# Patient Record
Sex: FEMALE | Race: WHITE | NOT HISPANIC OR LATINO | Employment: OTHER | URBAN - METROPOLITAN AREA
[De-identification: names, ages, dates, MRNs, and addresses within clinical notes are randomized per-mention and may not be internally consistent; named-entity substitution may affect disease eponyms.]

---

## 2012-06-08 LAB — CRC RECOMMENDATION EXT: NORMAL

## 2017-01-25 PROBLEM — R26.89 LOSS OF BALANCE: Status: ACTIVE | Noted: 2017-01-25

## 2017-01-25 PROBLEM — R42 DIZZINESS: Status: ACTIVE | Noted: 2017-01-25

## 2017-01-25 PROBLEM — Z85.3 HISTORY OF BREAST CANCER: Status: ACTIVE | Noted: 2017-01-25

## 2017-01-25 PROBLEM — Z85.72 HISTORY OF NON-HODGKIN'S LYMPHOMA: Status: ACTIVE | Noted: 2017-01-25

## 2017-02-14 PROBLEM — R73.9 HYPERGLYCEMIA: Status: ACTIVE | Noted: 2017-02-14

## 2017-02-14 PROBLEM — E78.5 HYPERLIPIDEMIA: Status: ACTIVE | Noted: 2017-02-14

## 2017-02-24 PROBLEM — R26.89 LIMPING: Status: ACTIVE | Noted: 2017-02-24

## 2017-02-24 PROBLEM — R42 DIZZINESS: Status: RESOLVED | Noted: 2017-01-25 | Resolved: 2017-02-24

## 2017-02-24 PROBLEM — R26.89 LOSS OF BALANCE: Status: RESOLVED | Noted: 2017-01-25 | Resolved: 2017-02-24

## 2017-06-06 ENCOUNTER — TELEPHONE (OUTPATIENT)
Dept: HEMATOLOGY/ONCOLOGY | Facility: CLINIC | Age: 62
End: 2017-06-06

## 2017-06-06 RX ORDER — HEPARIN 100 UNIT/ML
500 SYRINGE INTRAVENOUS
Status: CANCELLED | OUTPATIENT
Start: 2017-06-07

## 2017-06-06 RX ORDER — SODIUM CHLORIDE 0.9 % (FLUSH) 0.9 %
10 SYRINGE (ML) INJECTION
Status: CANCELLED | OUTPATIENT
Start: 2017-06-07

## 2017-06-07 RX ORDER — HEPARIN 100 UNIT/ML
500 SYRINGE INTRAVENOUS
Status: CANCELLED | OUTPATIENT
Start: 2017-06-07

## 2017-06-07 RX ORDER — SODIUM CHLORIDE 0.9 % (FLUSH) 0.9 %
10 SYRINGE (ML) INJECTION
Status: CANCELLED | OUTPATIENT
Start: 2017-06-07

## 2017-08-08 RX ORDER — SODIUM CHLORIDE 0.9 % (FLUSH) 0.9 %
10 SYRINGE (ML) INJECTION
Status: CANCELLED | OUTPATIENT
Start: 2017-08-08

## 2017-08-08 RX ORDER — HEPARIN 100 UNIT/ML
500 SYRINGE INTRAVENOUS
Status: CANCELLED | OUTPATIENT
Start: 2017-08-08

## 2017-09-12 ENCOUNTER — OFFICE VISIT (OUTPATIENT)
Dept: HEMATOLOGY/ONCOLOGY | Facility: CLINIC | Age: 62
End: 2017-09-12
Payer: MEDICARE

## 2017-09-12 VITALS
RESPIRATION RATE: 18 BRPM | BODY MASS INDEX: 25.57 KG/M2 | WEIGHT: 139.81 LBS | DIASTOLIC BLOOD PRESSURE: 85 MMHG | HEART RATE: 83 BPM | TEMPERATURE: 98 F | SYSTOLIC BLOOD PRESSURE: 132 MMHG

## 2017-09-12 DIAGNOSIS — C85.90: Primary | ICD-10-CM

## 2017-09-12 DIAGNOSIS — R51.9 NEW ONSET OF HEADACHES: ICD-10-CM

## 2017-09-12 DIAGNOSIS — Z85.3 PERSONAL HISTORY OF MALIGNANT NEOPLASM OF BREAST: ICD-10-CM

## 2017-09-12 PROCEDURE — 99214 OFFICE O/P EST MOD 30 MIN: CPT | Mod: ,,, | Performed by: INTERNAL MEDICINE

## 2017-09-12 PROCEDURE — 3075F SYST BP GE 130 - 139MM HG: CPT | Mod: ,,, | Performed by: INTERNAL MEDICINE

## 2017-09-12 PROCEDURE — 3079F DIAST BP 80-89 MM HG: CPT | Mod: ,,, | Performed by: INTERNAL MEDICINE

## 2017-09-13 NOTE — PROGRESS NOTES
[facility] [facility]    Subjective:      Patient ID:   Fanta Berg  61 y.o. female  1955        Chief Complaint:  Cancer follow up, ZENDEJAS x's 2 weeks.    HPI:  61 y.o. female with diagnosis of Bilateral breast cancer 2005.  Seen at [facility].  R MRM, L mastectomy.  No Rad Rx given.    Did take chemo x's 6 months.  Adjuvant tamoxifen x's 5 years.    MGUS . Followed at [facility].    Hx small cell NHL, treated with -R x's 6, then Rituxan maintenance.  Had neuro? Sx after Rituxan 2015, Rituxan D/C ed. Low grade NHL.  Follicular vs marginal NHL.    Chronic LBP sx 2nd DDD. S/P epidural injections.    C/O HA x's 2 weeks.    Hx HTN, rheumatoid fever as a child, GERD, arthritis sx, DDD.    Ovarian tumor removed at [facility] 2005.  R MRM 2005  L mastectomy   T & A  Back surgery   C section x's 3  Breast implants L & R 2006  Port removed 2012  Complete hysterectomy [facility]  M0    Allergy benadryl, codiene, levaquin    Smoke rarely, ETOH no, Job , disabled    Mom small cell lung cancer  Dad pancreatic cancer  Sister HTN  Son AMI  Daughter schizophrenia  Daughter panic attacks  Mom, sisters, daughters no breast cancer    ROS:   GEN: normal without any fever, night sweats or weight loss  HEENT:See HPI.   sore throat, stiff neck, changes in vision  CV: RF as a child,     no CP, SOB, PND, MACHADO or orthopnea  PULM: normal with no SOB, cough, hemoptysis, sputum or pleuritic pain  GI: GERD+.   no abdominal pain, nausea, vomiting, constipation, diarrhea, melanotic stools, BRBPR, or hematemesis  : normal with no hematuria, dysuria  BREAST: See HPI  SKIN: normal with no rash, erythema, bruising, or swelling     Past Medical History:   Diagnosis Date    Breast cancer     Breast cancer     bilat mast, chemo, arimidex     [facility]    Cancer     Cardiomyopathy due to chemotherapy     DDD (degenerative disc disease)     GERD (gastroesophageal reflux disease)     NHL (nodular  "histiocytic lymphoma)     Non Hodgkin's lymphoma     Non-Hodgkin lymphoma     Small cell B-cell lymphoma      Past Surgical History:   Procedure Laterality Date    BACK SURGERY      BREAST SURGERY       SECTION      HYSTERECTOMY      SPINE SURGERY      TONSILLECTOMY      TOTAL ABDOMINAL HYSTERECTOMY W/ BILATERAL SALPINGOOPHORECTOMY         Review of patient's allergies indicates:   Allergen Reactions    Benadryl [diphenhydramine hcl] Anaphylaxis    Levaquin [levofloxacin] Other (See Comments)     Permanent tendon & ligament damage    Ibuprofen-diphenhydramine cit Nausea Only      "Any meds with PM behind them "    Sulfamethoxazole-trimethoprim Nausea And Vomiting    Tolmetin Nausea Only    Codeine Palpitations and Rash    Duloxetine hcl Other (See Comments)     "increased sweating"     Social History     Social History    Marital status: Single     Spouse name: N/A    Number of children: N/A    Years of education: N/A     Occupational History    Not on file.     Social History Main Topics    Smoking status: Former Smoker     Quit date: 2012    Smokeless tobacco: Never Used    Alcohol use No    Drug use: No    Sexual activity: Not on file     Other Topics Concern    Not on file     Social History Narrative    No narrative on file         Current Outpatient Prescriptions:     hydrocodone-acetaminophen 10-325mg (NORCO)  mg Tab, Take 1 tablet by mouth 3 (three) times daily., Disp: , Rfl:     lorazepam (ATIVAN) 1 MG tablet, Take 1 tablet (1 mg total) by mouth daily as needed for Anxiety., Disp: 30 tablet, Rfl: 0          Objective:   Vitals:  Blood pressure 132/85, pulse 83, temperature 98.1 °F (36.7 °C), resp. rate 18, weight 63.4 kg (139 lb 12.8 oz).    Physical Examination:   GEN: no apparent distress, comfortable  HEAD: atraumatic and normocephalic  EYES: no pallor, no icterus  ENT: no pharyngeal erythema, external ears WNL; no nasal discharge; no thrush  NECK: no " "masses, thyroid normal, trachea midline, no LAD/LN's, supple  CV: RRR with no murmur; normal pulse; normal S1 and S2; no pedal edema  CHEST: Normal respiratory effort; CTAB; normal breath sounds; no wheeze or crackles  ABDOM: nontender and nondistended; soft; normal bowel sounds; no rebound/guarding  MUSC/Skeletal: ROM normal; no crepitus; joints normal  EXTREM: no clubbing, cyanosis, inflammation or swelling  SKIN: no rashes, lesions, ulcers, petechiae  : no yanez  NEURO: grossly intact; motor/sensory WNL;  no tremors  PSYCH: normal mood, affect and behavior  LYMPH: normal cervical, supraclavicular, axillary and groin LN's  "BREASTS": no palpable mass      Labs:   Lab Results   Component Value Date    WBC 7.1 07/13/2017    HGB 15.1 07/13/2017    HCT 44.1 07/13/2017    MCV 93.3 07/13/2017     07/13/2017    CMP  Sodium   Date Value Ref Range Status   07/13/2017 139 135 - 146 mmol/L Final     Potassium   Date Value Ref Range Status   07/13/2017 3.8 3.5 - 5.3 mmol/L Final     Chloride   Date Value Ref Range Status   07/13/2017 100 98 - 110 mmol/L Final     CO2   Date Value Ref Range Status   07/13/2017 31 20 - 31 mmol/L Final     Glucose   Date Value Ref Range Status   07/13/2017 80 65 - 99 mg/dL Final     Comment:                   Fasting reference interval          BUN, Bld   Date Value Ref Range Status   07/13/2017 9 7 - 25 mg/dL Final     Creatinine   Date Value Ref Range Status   07/13/2017 0.72 0.50 - 0.99 mg/dL Final     Comment:     For patients >49 years of age, the reference limit  for Creatinine is approximately 13% higher for people  identified as -American.          Calcium   Date Value Ref Range Status   07/13/2017 9.6 8.6 - 10.4 mg/dL Final     Total Protein   Date Value Ref Range Status   07/13/2017 6.8 6.1 - 8.1 g/dL Final     Albumin   Date Value Ref Range Status   07/13/2017 4.5 3.6 - 5.1 g/dL Final     Total Bilirubin   Date Value Ref Range Status   07/13/2017 0.4 0.2 - 1.2 mg/dL " Final     Alkaline Phosphatase   Date Value Ref Range Status   07/13/2017 68 33 - 130 U/L Final     AST   Date Value Ref Range Status   07/13/2017 14 10 - 35 U/L Final     ALT   Date Value Ref Range Status   07/13/2017 13 6 - 29 U/L Final     eGFR if    Date Value Ref Range Status   07/13/2017 103 > OR = 60 mL/min/1.73m2 Final     eGFR if non    Date Value Ref Range Status   07/13/2017 89 > OR = 60 mL/min/1.73m2 Final     I have reviewed all available lab results and radiology reports.    Radiology/Diagnostic Studies:    PET SCAN, MRI of BRAIN ordered, she wants to wait till after 10/4/17.  Has grand children in town.  All lab results and imaging results have been reviewed and discussed with the patient    Assessment:   (1) 61 y.o. female with diagnosis of  Bilateral breast cancer 2005, low grade NHL 2013.       Two week hx HA sx.    (2)Check MRI of brain PET scan.  RTC 10/2017.    Plan:       Lymphosarcoma, mixed cell type, diffuse  -     CBC auto differential; Future; Expected date: 10/09/2017  -     Comprehensive metabolic panel; Future; Expected date: 10/09/2017  -     NM PET CT Routine Skull to Mid Thigh    Personal history of malignant neoplasm of breast  -     CBC auto differential; Future; Expected date: 10/09/2017  -     Comprehensive metabolic panel; Future; Expected date: 10/09/2017  -     NM PET CT Routine Skull to Mid Thigh    New onset of headaches  -     MRI Brain W WO Contrast      Return in about 5 weeks (around 10/16/2017) for follow up on cancer status.

## 2017-10-09 ENCOUNTER — HISTORICAL (OUTPATIENT)
Dept: ADMINISTRATIVE | Facility: HOSPITAL | Age: 62
End: 2017-10-09

## 2017-10-09 LAB
ALBUMIN SERPL-MCNC: 4.3 G/DL (ref 3.1–4.7)
ALP SERPL-CCNC: 63 IU/L (ref 40–104)
ALT (SGPT): 14 IU/L (ref 3–33)
AST SERPL-CCNC: 17 IU/L (ref 10–40)
BASOPHILS NFR BLD: 0 K/UL (ref 0–0.2)
BASOPHILS NFR BLD: 0.5 %
BILIRUB SERPL-MCNC: 0.7 MG/DL (ref 0.3–1)
BUN SERPL-MCNC: 14 MG/DL (ref 8–20)
CALCIUM SERPL-MCNC: 9.6 MG/DL (ref 7.7–10.4)
CHLORIDE: 100 MMOL/L (ref 98–110)
CO2 SERPL-SCNC: 29.5 MMOL/L (ref 22.8–31.6)
CREATININE: 0.63 MG/DL (ref 0.6–1.4)
EOSINOPHIL NFR BLD: 0.1 K/UL (ref 0–0.7)
EOSINOPHIL NFR BLD: 0.8 %
ERYTHROCYTE [DISTWIDTH] IN BLOOD BY AUTOMATED COUNT: 11.6 % (ref 12.5–14.5)
GLUCOSE SERPL-MCNC: 77 MG/DL (ref 70–99)
GLUCOSE: 87 MG/DL (ref 70–99)
GRAN #: 5.2 K/UL (ref 1.4–6.5)
GRAN%: 60.7 %
HCT VFR BLD AUTO: 42.8 % (ref 36–48)
HGB BLD-MCNC: 14.7 G/DL (ref 12–15)
IMMATURE GRANS (ABS): 0 K/UL (ref 0–1)
IMMATURE GRANULOCYTES: 0.4 %
ISTAT CREATININE: 0.7 MG/DL (ref 0.6–1.4)
LYMPH #: 2.5 K/UL (ref 1.2–3.4)
LYMPH%: 28.7 %
MCH RBC QN AUTO: 31.6 PG (ref 25–35)
MCHC RBC AUTO-ENTMCNC: 34.3 G/DL (ref 31–36)
MCV RBC AUTO: 92 FL (ref 79–98)
MONO #: 0.8 K/UL (ref 0.1–0.6)
MONO%: 8.9 %
NUCLEATED RBCS: 0 %
NUCLEATED RED BLOOD CELLS: 0 /100 WBC
PERFORMED BY:: ABNORMAL
PLATELET # BLD AUTO: 282 K/UL (ref 140–440)
PMV BLD AUTO: 8.8 FL (ref 8.8–12.7)
POTASSIUM SERPL-SCNC: 3.8 MMOL/L (ref 3.5–5)
PROT SERPL-MCNC: 7.2 G/DL (ref 6–8.2)
RBC # BLD AUTO: 4.65 M/UL (ref 3.5–5.5)
SODIUM: 137 MMOL/L (ref 134–144)
WBC # BLD: 8.6 K/UL (ref 5–10)

## 2017-10-17 ENCOUNTER — OFFICE VISIT (OUTPATIENT)
Dept: HEMATOLOGY/ONCOLOGY | Facility: CLINIC | Age: 62
End: 2017-10-17
Payer: MEDICARE

## 2017-10-17 VITALS
HEART RATE: 84 BPM | WEIGHT: 141.19 LBS | HEIGHT: 62 IN | DIASTOLIC BLOOD PRESSURE: 76 MMHG | RESPIRATION RATE: 18 BRPM | BODY MASS INDEX: 25.98 KG/M2 | TEMPERATURE: 98 F | SYSTOLIC BLOOD PRESSURE: 121 MMHG

## 2017-10-17 DIAGNOSIS — C83.00 SMALL B-CELL LYMPHOMA, UNSPECIFIED BODY REGION: ICD-10-CM

## 2017-10-17 DIAGNOSIS — C50.111 MALIGNANT NEOPLASM OF CENTRAL PORTION OF RIGHT BREAST IN FEMALE, ESTROGEN RECEPTOR POSITIVE: Primary | ICD-10-CM

## 2017-10-17 DIAGNOSIS — Z17.0 MALIGNANT NEOPLASM OF CENTRAL PORTION OF RIGHT BREAST IN FEMALE, ESTROGEN RECEPTOR POSITIVE: Primary | ICD-10-CM

## 2017-10-17 PROCEDURE — 99214 OFFICE O/P EST MOD 30 MIN: CPT | Mod: ,,, | Performed by: INTERNAL MEDICINE

## 2017-10-17 NOTE — LETTER
October 17, 2017      Bharath Pierre III, MD  [address]  [place] [address]           [facility]  [address]  [address]  [place] [address]  Phone: [phone]  Fax: [phone]          Patient: Fanta Berg   MR Number: [id number]   YOB: 1955   Date of Visit: 10/17/2017       Dear Dr. Bharath Pierre III:    Thank you for referring Fanta Berg to me for evaluation. Attached you will find relevant portions of my assessment and plan of care.    If you have questions, please do not hesitate to call me. I look forward to following Fanta Berg along with you.    Sincerely,    PRIYANK Noonan MD    Enclosure  CC:  No Recipients    If you would like to receive this communication electronically, please contact [email or web address] or [phone] to request more information on [facility] Link access.    For providers and/or their staff who would like to refer a patient to [facility], please contact us through our one-stop-shop provider referral line, [facility], at [phone].    If you feel you have received this communication in error or would no longer like to receive these types of communications, please e-mail [email or web address]

## 2017-10-17 NOTE — PROGRESS NOTES
"     [facility]    Subjective:      Patient ID:   Fanta Berg  62 y.o. female  1955  [unfilled]      Chief Complaint:   Follow-up and Results (labs and scans in epic)      HPI:  62 y.o. female with diagnosis of             ROS:   GEN: normal without any fever, night sweats or weight loss  HEENT: normal with no HA's, sore throat, stiff neck, changes in vision  CV: normal with no CP, SOB, PND, MACHADO or orthopnea  PULM: normal with no SOB, cough, hemoptysis, sputum or pleuritic pain  GI: normal with no abdominal pain, nausea, vomiting, constipation, diarrhea, melanotic stools, BRBPR, or hematemesis  : normal with no hematuria, dysuria  BREAST: normal with no mass, discharge, pain  SKIN: normal with no rash, erythema, bruising, or swelling     Past Medical History:   Diagnosis Date    Breast cancer     Breast cancer     bilat mast, chemo, arimidex     [facility]    Cancer     Cardiomyopathy due to chemotherapy     DDD (degenerative disc disease)     GERD (gastroesophageal reflux disease)     NHL (nodular histiocytic lymphoma)     Non Hodgkin's lymphoma     Non-Hodgkin lymphoma     Small cell B-cell lymphoma      Past Surgical History:   Procedure Laterality Date    BACK SURGERY      BREAST SURGERY       SECTION      HYSTERECTOMY      SPINE SURGERY      TONSILLECTOMY      TOTAL ABDOMINAL HYSTERECTOMY W/ BILATERAL SALPINGOOPHORECTOMY         Review of patient's allergies indicates:   Allergen Reactions    Benadryl [diphenhydramine hcl] Anaphylaxis    Levaquin [levofloxacin] Other (See Comments)     PATIENT STATES THIS MEDICATION CAUSES HER JOINT PAIN AND WEAKNESS  n Tendinopathy  Tendon, Ligament damage from use after infection  Permanent tendon & ligament damage    Ibuprofen-diphenhydramine cit Nausea Only      "Any meds with PM behind them "    Sulfamethoxazole-trimethoprim Nausea And Vomiting    Tolmetin Nausea Only    Codeine Palpitations and Rash     Breathing " "difficulty    Duloxetine hcl Other (See Comments)     "increased sweating"     Social History     Social History    Marital status: Single     Spouse name: N/A    Number of children: N/A    Years of education: N/A     Occupational History    Not on file.     Social History Main Topics    Smoking status: Former Smoker     Quit date: 1/1/2012    Smokeless tobacco: Never Used    Alcohol use No    Drug use: No    Sexual activity: Not on file     Other Topics Concern    Not on file     Social History Narrative    No narrative on file         Current Outpatient Prescriptions:     hydrocodone-acetaminophen 10-325mg (NORCO)  mg Tab, Take 1 tablet by mouth 3 (three) times daily., Disp: , Rfl:     lorazepam (ATIVAN) 1 MG tablet, Take 1 tablet (1 mg total) by mouth daily as needed for Anxiety., Disp: 30 tablet, Rfl: 0          Objective:   Vitals:  Blood pressure 121/76, pulse 84, temperature 98.2 °F (36.8 °C), resp. rate 18, height 5' 2" (1.575 m), weight 64 kg (141 lb 3.2 oz).    Physical Examination:   GEN: no apparent distress, comfortable; AAOx3  HEAD: atraumatic and normocephalic  EYES: no pallor, no icterus, PERRLA  ENT: OMM, no pharyngeal erythema, external ears WNL; no nasal discharge; no thrush  NECK: no masses, thyroid normal, trachea midline, no LAD/LN's, supple  CV: RRR with no murmur; normal pulse; normal S1 and S2; no pedal edema  CHEST: Normal respiratory effort; CTAB; normal breath sounds; no wheeze or crackles  ABDOM: nontender and nondistended; soft; normal bowel sounds; no rebound/guarding  MUSC/Skeletal: ROM normal; no crepitus; joints normal; no deformities or arthropathy  EXTREM: no clubbing, cyanosis, inflammation or swelling  SKIN: no rashes, lesions, ulcers, petechiae or subcutaneous nodules  : no yanez  NEURO: grossly intact; motor/sensory WNL; AAOx3; no tremors  PSYCH: normal mood, affect and behavior  LYMPH: normal cervical, supraclavicular, axillary and groin LN's      Labs: " "  Lab Results   Component Value Date    WBC 8.6 10/09/2017    HGB 14.7 10/09/2017    HCT 42.8 10/09/2017    MCV 93.3 07/13/2017     10/09/2017    CMP  Sodium   Date Value Ref Range Status   10/09/2017 137 134 - 144 mmol/L      Potassium   Date Value Ref Range Status   10/09/2017 3.8 3.5 - 5.0 mmol/L      Chloride   Date Value Ref Range Status   10/09/2017 100 98 - 110 mmol/L      CO2   Date Value Ref Range Status   10/09/2017 29.5 22.8 - 31.6 mmol/L      Glucose   Date Value Ref Range Status   10/09/2017 87 70 - 99 mg/dL      BUN, Bld   Date Value Ref Range Status   10/09/2017 14 8 - 20 mg/dL      Creatinine   Date Value Ref Range Status   10/09/2017 0.63 0.60 - 1.40 mg/dL      Calcium   Date Value Ref Range Status   10/09/2017 9.6 7.7 - 10.4 mg/dL      Total Protein   Date Value Ref Range Status   10/09/2017 7.2 6.0 - 8.2 g/dL      Albumin   Date Value Ref Range Status   10/09/2017 4.3 3.1 - 4.7 g/dL      Total Bilirubin   Date Value Ref Range Status   10/09/2017 0.7 0.3 - 1.0 mg/dL      Alkaline Phosphatase   Date Value Ref Range Status   10/09/2017 63 40 - 104 IU/L      AST   Date Value Ref Range Status   10/09/2017 17 10 - 40 IU/L      ALT   Date Value Ref Range Status   07/13/2017 13 6 - 29 U/L Final     eGFR if    Date Value Ref Range Status   07/13/2017 103 > OR = 60 mL/min/1.73m2 Final     eGFR if non    Date Value Ref Range Status   07/13/2017 89 > OR = 60 mL/min/1.73m2 Final     I have reviewed all available lab results and radiology reports.    Radiology/Diagnostic Studies:    {Results; Diagnostics:62741101::"***"}  {RAD/ONC TEST LIST:78495}      All lab results and imaging results have been reviewed and discussed with the patient    Assessment:   (1) 62 y.o. female with diagnosis of     (2)          No diagnosis found.    Plan:       There are no diagnoses linked to this encounter.  No Follow-up on file.    I have explained and the patient understands all of  the " current recommendation(s). I have answered all of their questions to the best of my ability and to their complete satisfaction.     ***

## 2017-10-22 NOTE — PROGRESS NOTES
[facility] History & Physical    Subjective:      Patient ID:   Fanta Berg  62 y.o. female  1955        Chief Complaint:  Cancer follow up, ZENDEJAS x's 2 weeks.    HPI:  62 y.o. female with diagnosis of Bilateral breast cancer 2005.  Seen at [facility].  R MRM, L mastectomy.  No Rad Rx given.    Did take chemo x's 6 months.  Adjuvant tamoxifen x's 5 years.    MGUS . Followed at [facility].    Hx small cell NHL, treated with -R x's 6, then Rituxan maintenance.  Had neuro? Sx after Rituxan 2015, Rituxan D/C ed. Low grade NHL.  Follicular vs marginal NHL.    Chronic LBP sx 2nd DDD. S/P epidural injections.    C/O HA x's 2 weeks.    Hx HTN, rheumatoid fever as a child, GERD, arthritis sx, DDD.    Ovarian tumor removed at [facility] 2005.  R MRM 2005  L mastectomy   T & A  Back surgery   C section x's 3  Breast implants L & R 2006  Port removed 2012  Complete hysterectomy [facility]  M0    Allergy benadryl, codiene, levaquin    Smoke rarely, ETOH no, Job , disabled    Mom small cell lung cancer  Dad pancreatic cancer  Sister HTN  Son AMI  Daughter schizophrenia  Daughter panic attacks  Mom, sisters, daughters no breast cancer    ROS:   GEN: normal without any fever, night sweats or weight loss  HEENT:See HPI.   sore throat, stiff neck, changes in vision  CV: RF as a child,     no CP, SOB, PND, MACHADO or orthopnea  PULM: normal with no SOB, cough, hemoptysis, sputum or pleuritic pain  GI: GERD+.   no abdominal pain, nausea, vomiting, constipation, diarrhea, melanotic stools, BRBPR, or hematemesis  : normal with no hematuria, dysuria  BREAST: See HPI  SKIN: normal with no rash, erythema, bruising, or swelling     Past Medical History:   Diagnosis Date    Breast cancer     Breast cancer     bilat mast, chemo, arimidex     [facility]    Cancer     Cardiomyopathy due to chemotherapy     DDD (degenerative disc disease)     GERD (gastroesophageal reflux disease)     NHL (nodular  "histiocytic lymphoma)     Non Hodgkin's lymphoma     Non-Hodgkin lymphoma     Small cell B-cell lymphoma      Past Surgical History:   Procedure Laterality Date    BACK SURGERY      BREAST SURGERY       SECTION      HYSTERECTOMY      SPINE SURGERY      TONSILLECTOMY      TOTAL ABDOMINAL HYSTERECTOMY W/ BILATERAL SALPINGOOPHORECTOMY         Review of patient's allergies indicates:   Allergen Reactions    Benadryl [diphenhydramine hcl] Anaphylaxis    Levaquin [levofloxacin] Other (See Comments)     Permanent tendon & ligament damage    Ibuprofen-diphenhydramine cit Nausea Only      "Any meds with PM behind them "    Sulfamethoxazole-trimethoprim Nausea And Vomiting    Tolmetin Nausea Only    Codeine Palpitations and Rash    Duloxetine hcl Other (See Comments)     "increased sweating"     Social History     Social History    Marital status: Single     Spouse name: N/A    Number of children: N/A    Years of education: N/A     Occupational History    Not on file.     Social History Main Topics    Smoking status: Former Smoker     Quit date: 2012    Smokeless tobacco: Never Used    Alcohol use No    Drug use: No    Sexual activity: Not on file     Other Topics Concern    Not on file     Social History Narrative    No narrative on file         Current Outpatient Prescriptions:     hydrocodone-acetaminophen 10-325mg (NORCO)  mg Tab, Take 1 tablet by mouth 3 (three) times daily., Disp: , Rfl:     lorazepam (ATIVAN) 1 MG tablet, Take 1 tablet (1 mg total) by mouth daily as needed for Anxiety., Disp: 30 tablet, Rfl: 0          Objective:   Vitals:  Blood pressure 121/76, pulse 84, temperature 98.2 °F (36.8 °C), resp. rate 18, height 5' 2" (1.575 m), weight 64 kg (141 lb 3.2 oz).    Physical Examination:   GEN: no apparent distress, comfortable  HEAD: atraumatic and normocephalic  EYES: no pallor, no icterus  ENT: no pharyngeal erythema, external ears WNL; no nasal discharge; no " "thrush  NECK: no masses, thyroid normal, trachea midline, no LAD/LN's, supple  CV: RRR with no murmur; normal pulse; normal S1 and S2; no pedal edema  CHEST: Normal respiratory effort; CTAB; normal breath sounds; no wheeze or crackles  ABDOM: nontender and nondistended; soft; normal bowel sounds; no rebound/guarding  MUSC/Skeletal: ROM normal; no crepitus; joints normal  EXTREM: no clubbing, cyanosis, inflammation or swelling  SKIN: no rashes, lesions, ulcers, petechiae  : no yanez  NEURO: grossly intact; motor/sensory WNL;  no tremors  PSYCH: normal mood, affect and behavior  LYMPH: normal cervical, supraclavicular, axillary and groin LN's  "BREASTS": no palpable mass      Labs:   Lab Results   Component Value Date    WBC 8.6 10/09/2017    HGB 14.7 10/09/2017    HCT 42.8 10/09/2017    MCV 93.3 07/13/2017     10/09/2017    CMP  Sodium   Date Value Ref Range Status   10/09/2017 137 134 - 144 mmol/L      Potassium   Date Value Ref Range Status   10/09/2017 3.8 3.5 - 5.0 mmol/L      Chloride   Date Value Ref Range Status   10/09/2017 100 98 - 110 mmol/L      CO2   Date Value Ref Range Status   10/09/2017 29.5 22.8 - 31.6 mmol/L      Glucose   Date Value Ref Range Status   10/09/2017 87 70 - 99 mg/dL      BUN, Bld   Date Value Ref Range Status   10/09/2017 14 8 - 20 mg/dL      Creatinine   Date Value Ref Range Status   10/09/2017 0.63 0.60 - 1.40 mg/dL      Calcium   Date Value Ref Range Status   10/09/2017 9.6 7.7 - 10.4 mg/dL      Total Protein   Date Value Ref Range Status   10/09/2017 7.2 6.0 - 8.2 g/dL      Albumin   Date Value Ref Range Status   10/09/2017 4.3 3.1 - 4.7 g/dL      Total Bilirubin   Date Value Ref Range Status   10/09/2017 0.7 0.3 - 1.0 mg/dL      Alkaline Phosphatase   Date Value Ref Range Status   10/09/2017 63 40 - 104 IU/L      AST   Date Value Ref Range Status   10/09/2017 17 10 - 40 IU/L      ALT   Date Value Ref Range Status   07/13/2017 13 6 - 29 U/L Final     eGFR if  " American   Date Value Ref Range Status   07/13/2017 103 > OR = 60 mL/min/1.73m2 Final     eGFR if non    Date Value Ref Range Status   07/13/2017 89 > OR = 60 mL/min/1.73m2 Final     I have reviewed all available lab results and radiology reports.    Radiology/Diagnostic Studies:    MRI no CVA, no metastases, microvascular change seen.    PET no metastatic dx, no NHL seen, SONYA.    Assessment:   (1) 62 y.o. female with diagnosis of  Bilateral breast cancer 2005, low grade NHL 2013.       Two week hx HA sx.    HA resolved.    No evidence for recurrent breast cancer or NHL.    Plan:       Malignant neoplasm of central portion of right breast in female, estrogen receptor positive    Small B-cell lymphoma, unspecified body region      Return in about 6 months (around 4/17/2018) for follow up on cancer status.

## 2017-10-22 NOTE — PROGRESS NOTES
"     [facility] History & Physical    Subjective:      Patient ID:   Fanta Berg  62 y.o. female  1955  [unfilled]      Chief Complaint:   Follow-up and Results (labs and scans in epic)      HPI:  62 y.o. female with diagnosis of             ROS:   GEN: normal without any fever, night sweats or weight loss  HEENT: normal with no HA's, sore throat, stiff neck, changes in vision  CV: normal with no CP, SOB, PND, MACHADO or orthopnea  PULM: normal with no SOB, cough, hemoptysis, sputum or pleuritic pain  GI: normal with no abdominal pain, nausea, vomiting, constipation, diarrhea, melanotic stools, BRBPR, or hematemesis  : normal with no hematuria, dysuria  BREAST: normal with no mass, discharge, pain  SKIN: normal with no rash, erythema, bruising, or swelling     Past Medical History:   Diagnosis Date    Breast cancer     Breast cancer     bilat mast, chemo, arimidex     mda    Cancer     Cardiomyopathy due to chemotherapy     DDD (degenerative disc disease)     GERD (gastroesophageal reflux disease)     NHL (nodular histiocytic lymphoma)     Non Hodgkin's lymphoma     Non-Hodgkin lymphoma     Small cell B-cell lymphoma      Past Surgical History:   Procedure Laterality Date    BACK SURGERY      BREAST SURGERY       SECTION      HYSTERECTOMY      SPINE SURGERY      TONSILLECTOMY      TOTAL ABDOMINAL HYSTERECTOMY W/ BILATERAL SALPINGOOPHORECTOMY         Review of patient's allergies indicates:   Allergen Reactions    Benadryl [diphenhydramine hcl] Anaphylaxis    Levaquin [levofloxacin] Other (See Comments)     PATIENT STATES THIS MEDICATION CAUSES HER JOINT PAIN AND WEAKNESS  n Tendinopathy  Tendon, Ligament damage from use after infection  Permanent tendon & ligament damage    Ibuprofen-diphenhydramine cit Nausea Only      "Any meds with PM behind them "    Sulfamethoxazole-trimethoprim Nausea And Vomiting    Tolmetin Nausea Only    Codeine Palpitations and Rash     Breathing " "difficulty    Duloxetine hcl Other (See Comments)     "increased sweating"     Social History     Social History    Marital status: Single     Spouse name: N/A    Number of children: N/A    Years of education: N/A     Occupational History    Not on file.     Social History Main Topics    Smoking status: Former Smoker     Quit date: 1/1/2012    Smokeless tobacco: Never Used    Alcohol use No    Drug use: No    Sexual activity: Not on file     Other Topics Concern    Not on file     Social History Narrative    No narrative on file         Current Outpatient Prescriptions:     hydrocodone-acetaminophen 10-325mg (NORCO)  mg Tab, Take 1 tablet by mouth 3 (three) times daily., Disp: , Rfl:     lorazepam (ATIVAN) 1 MG tablet, Take 1 tablet (1 mg total) by mouth daily as needed for Anxiety., Disp: 30 tablet, Rfl: 0          Objective:   Vitals:  Blood pressure 121/76, pulse 84, temperature 98.2 °F (36.8 °C), resp. rate 18, height 5' 2" (1.575 m), weight 64 kg (141 lb 3.2 oz).    Physical Examination:   GEN: no apparent distress, comfortable; AAOx3  HEAD: atraumatic and normocephalic  EYES: no pallor, no icterus, PERRLA  ENT: OMM, no pharyngeal erythema, external ears WNL; no nasal discharge; no thrush  NECK: no masses, thyroid normal, trachea midline, no LAD/LN's, supple  CV: RRR with no murmur; normal pulse; normal S1 and S2; no pedal edema  CHEST: Normal respiratory effort; CTAB; normal breath sounds; no wheeze or crackles  ABDOM: nontender and nondistended; soft; normal bowel sounds; no rebound/guarding  MUSC/Skeletal: ROM normal; no crepitus; joints normal; no deformities or arthropathy  EXTREM: no clubbing, cyanosis, inflammation or swelling  SKIN: no rashes, lesions, ulcers, petechiae or subcutaneous nodules  : no yanez  NEURO: grossly intact; motor/sensory WNL; AAOx3; no tremors  PSYCH: normal mood, affect and behavior  LYMPH: normal cervical, supraclavicular, axillary and groin LN's      Labs: " "  Lab Results   Component Value Date    WBC 8.6 10/09/2017    HGB 14.7 10/09/2017    HCT 42.8 10/09/2017    MCV 93.3 07/13/2017     10/09/2017    CMP  Sodium   Date Value Ref Range Status   10/09/2017 137 134 - 144 mmol/L      Potassium   Date Value Ref Range Status   10/09/2017 3.8 3.5 - 5.0 mmol/L      Chloride   Date Value Ref Range Status   10/09/2017 100 98 - 110 mmol/L      CO2   Date Value Ref Range Status   10/09/2017 29.5 22.8 - 31.6 mmol/L      Glucose   Date Value Ref Range Status   10/09/2017 87 70 - 99 mg/dL      BUN, Bld   Date Value Ref Range Status   10/09/2017 14 8 - 20 mg/dL      Creatinine   Date Value Ref Range Status   10/09/2017 0.63 0.60 - 1.40 mg/dL      Calcium   Date Value Ref Range Status   10/09/2017 9.6 7.7 - 10.4 mg/dL      Total Protein   Date Value Ref Range Status   10/09/2017 7.2 6.0 - 8.2 g/dL      Albumin   Date Value Ref Range Status   10/09/2017 4.3 3.1 - 4.7 g/dL      Total Bilirubin   Date Value Ref Range Status   10/09/2017 0.7 0.3 - 1.0 mg/dL      Alkaline Phosphatase   Date Value Ref Range Status   10/09/2017 63 40 - 104 IU/L      AST   Date Value Ref Range Status   10/09/2017 17 10 - 40 IU/L      ALT   Date Value Ref Range Status   07/13/2017 13 6 - 29 U/L Final     eGFR if    Date Value Ref Range Status   07/13/2017 103 > OR = 60 mL/min/1.73m2 Final     eGFR if non    Date Value Ref Range Status   07/13/2017 89 > OR = 60 mL/min/1.73m2 Final     I have reviewed all available lab results and radiology reports.    Radiology/Diagnostic Studies:    {Results; Diagnostics:[id number]::"***"}  {RAD/ONC TEST LIST:21915}      All lab results and imaging results have been reviewed and discussed with the patient    Assessment:   (1) 62 y.o. female with diagnosis of     (2)          1. Malignant neoplasm of central portion of right breast in female, estrogen receptor positive    2. Small B-cell lymphoma, unspecified body region        Plan:     "   Malignant neoplasm of central portion of right breast in female, estrogen receptor positive    Small B-cell lymphoma, unspecified body region      Return in about 6 months (around 4/17/2018) for follow up on cancer status.    I have explained and the patient understands all of  the current recommendation(s). I have answered all of their questions to the best of my ability and to their complete satisfaction.     ***

## 2018-04-02 RX ORDER — SODIUM CHLORIDE 0.9 % (FLUSH) 0.9 %
10 SYRINGE (ML) INJECTION
Status: CANCELLED | OUTPATIENT
Start: 2018-04-02

## 2018-04-02 RX ORDER — HEPARIN 100 UNIT/ML
500 SYRINGE INTRAVENOUS
Status: CANCELLED | OUTPATIENT
Start: 2018-04-02

## 2018-05-14 ENCOUNTER — OFFICE VISIT (OUTPATIENT)
Dept: HEMATOLOGY/ONCOLOGY | Facility: CLINIC | Age: 63
End: 2018-05-14
Payer: MEDICARE

## 2018-05-14 VITALS
BODY MASS INDEX: 25.5 KG/M2 | RESPIRATION RATE: 18 BRPM | TEMPERATURE: 98 F | DIASTOLIC BLOOD PRESSURE: 83 MMHG | HEART RATE: 86 BPM | SYSTOLIC BLOOD PRESSURE: 143 MMHG | WEIGHT: 139.38 LBS

## 2018-05-14 DIAGNOSIS — C83.00 SMALL B-CELL LYMPHOMA, UNSPECIFIED BODY REGION: Primary | ICD-10-CM

## 2018-05-14 DIAGNOSIS — M51.37 DDD (DEGENERATIVE DISC DISEASE), LUMBOSACRAL: ICD-10-CM

## 2018-05-14 DIAGNOSIS — Z85.3 HISTORY OF BREAST CANCER: ICD-10-CM

## 2018-05-14 PROCEDURE — 99214 OFFICE O/P EST MOD 30 MIN: CPT | Mod: ,,, | Performed by: INTERNAL MEDICINE

## 2018-05-14 RX ORDER — FLUTICASONE PROPIONATE 50 MCG
SPRAY, SUSPENSION (ML) NASAL
COMMUNITY
Start: 2018-05-09 | End: 2018-06-11 | Stop reason: SDUPTHER

## 2018-05-14 NOTE — LETTER
May 14, 2018      Bharath Pierre III, MD  [address]  [place] [address]           Replaced by [facility]  [address]  [address]  [facility] [address]  Phone: [phone]  Fax: [phone]          Patient: Fanta Berg   MR Number: [id number]   YOB: 1955   Date of Visit: 5/14/2018       Dear Dr. Bharath Pierre III:    Thank you for referring Fanta Berg to me for evaluation. Attached you will find relevant portions of my assessment and plan of care.    If you have questions, please do not hesitate to call me. I look forward to following Fanta Berg along with you.    Sincerely,    PRIYANK Noonan MD    Enclosure  CC:  No Recipients    If you would like to receive this communication electronically, please contact [email or web address] or [phone] to request more information on Pin or Peg Link access.    For providers and/or their staff who would like to refer a patient to [facility], please contact us through our one-stop-shop provider referral line, [facility] [place], at [phone].    If you feel you have received this communication in error or would no longer like to receive these types of communications, please e-mail [email or web address]

## 2018-05-14 NOTE — PROGRESS NOTES
[facility] History & Physical    Subjective:      Patient ID:   Fanta Berg  62 y.o. female  1955  [place]      Chief Complaint:  Cancer follow up, ZENDEJAS x's 2 weeks.    HPI:  62 y.o. female with diagnosis of Bilateral breast cancer 2005.  Seen at [facility].  R MRM, L mastectomy.  No Rad Rx given.    Did take chemo x's 6 months.  Adjuvant tamoxifen x's 5 years.    MGUS . Followed at [facility].    Hx small cell NHL, treated with -R x's 6, then Rituxan maintenance.  Had neuro? Sx after Rituxan 2015, Rituxan D/C ed. Low grade NHL.  Follicular vs marginal NHL.    Chronic LBP sx 2nd DDD. S/P epidural injections.  Chronic neck pains, hx C spine fx.  On PT.    Hx HTN, rheumatoid fever as a child, GERD, arthritis sx, DDD.    Ovarian tumor removed at [facility] 2005.  R MRM 2005  L mastectomy   T & A  Back surgery   C section x's 3  Breast implants L & R 2006  Port removed 2012  Complete hysterectomy [facility]  M0    Allergy benadryl, codiene, levaquin    Smoke rarely, ETOH no, Job , disabled    Mom small cell lung cancer  Dad pancreatic cancer  Sister HTN  Son AMI  Daughter schizophrenia  Daughter panic attacks  Mom, sisters, daughters no breast cancer    ROS:   GEN: normal without any fever, night sweats or weight loss  HEENT:See HPI.   sore throat, stiff neck, changes in vision  CV: RF as a child,     no CP, SOB, PND, MACHADO or orthopnea  PULM: normal with no SOB, cough, hemoptysis, sputum or pleuritic pain  GI: GERD+.   no abdominal pain, nausea, vomiting, constipation, diarrhea, melanotic stools, BRBPR, or hematemesis  : normal with no hematuria, dysuria  BREAST: See HPI  SKIN: normal with no rash, erythema, bruising, or swelling     Past Medical History:   Diagnosis Date    Breast cancer     Breast cancer     bilat mast, chemo, arimidex     [facility]    Cancer     Cardiomyopathy due to chemotherapy     DDD (degenerative disc disease)     GERD (gastroesophageal reflux  "disease)     NHL (nodular histiocytic lymphoma)     Non Hodgkin's lymphoma     Non-Hodgkin lymphoma     Small cell B-cell lymphoma      Past Surgical History:   Procedure Laterality Date    BACK SURGERY      BREAST SURGERY       SECTION      HYSTERECTOMY      SPINE SURGERY      TONSILLECTOMY      TOTAL ABDOMINAL HYSTERECTOMY W/ BILATERAL SALPINGOOPHORECTOMY         Review of patient's allergies indicates:   Allergen Reactions    Benadryl [diphenhydramine hcl] Anaphylaxis    Levaquin [levofloxacin] Other (See Comments)     Permanent tendon & ligament damage    Ibuprofen-diphenhydramine cit Nausea Only      "Any meds with PM behind them "    Sulfamethoxazole-trimethoprim Nausea And Vomiting    Tolmetin Nausea Only    Codeine Palpitations and Rash    Duloxetine hcl Other (See Comments)     "increased sweating"     Social History     Social History    Marital status: Single     Spouse name: N/A    Number of children: N/A    Years of education: N/A     Occupational History    Not on file.     Social History Main Topics    Smoking status: Former Smoker     Quit date: 2012    Smokeless tobacco: Never Used    Alcohol use No    Drug use: No    Sexual activity: Not on file     Other Topics Concern    Not on file     Social History Narrative    No narrative on file         Current Outpatient Prescriptions:     hydrocodone-acetaminophen 10-325mg (NORCO)  mg Tab, Take 1 tablet by mouth 3 (three) times daily., Disp: , Rfl:     acyclovir 5% (ZOVIRAX) 5 % ointment, Apply topically 3 (three) times daily as needed., Disp: 30 g, Rfl: 3    fluticasone (FLONASE) 50 mcg/actuation nasal spray, , Disp: , Rfl:     lorazepam (ATIVAN) 1 MG tablet, Take 1 tablet (1 mg total) by mouth daily as needed for Anxiety., Disp: 30 tablet, Rfl: 0          Objective:   Vitals:  Blood pressure (!) 143/83, pulse 86, temperature 98.1 °F (36.7 °C), resp. rate 18, weight 63.2 kg (139 lb 6.4 oz).    Physical " "Examination:   GEN: no apparent distress, comfortable  HEAD: atraumatic and normocephalic  EYES: no pallor, no icterus  ENT: no pharyngeal erythema, external ears WNL; no nasal discharge; no thrush  NECK: no masses, thyroid normal, trachea midline, no LAD/LN's, supple  CV: RRR with no murmur; normal pulse; normal S1 and S2; no pedal edema  CHEST: Normal respiratory effort; CTAB; normal breath sounds; no wheeze or crackles  ABDOM: nontender and nondistended; soft; normal bowel sounds; no rebound/guarding  MUSC/Skeletal: ROM normal; no crepitus; joints normal  EXTREM: no clubbing, cyanosis, inflammation or swelling  SKIN: no rashes, lesions, ulcers, petechiae  : no yanez  NEURO: grossly intact; motor/sensory WNL;  no tremors  PSYCH: normal mood, affect and behavior  LYMPH: normal cervical, supraclavicular, axillary and groin LN's  "BREASTS": no palpable mass      Labs:   Lab Results   Component Value Date    WBC 8.6 10/09/2017    HGB 14.7 10/09/2017    HCT 42.8 10/09/2017    MCV 93.3 07/13/2017     10/09/2017    CMP  Sodium   Date Value Ref Range Status   10/09/2017 137 134 - 144 mmol/L      Potassium   Date Value Ref Range Status   10/09/2017 3.8 3.5 - 5.0 mmol/L      Chloride   Date Value Ref Range Status   10/09/2017 100 98 - 110 mmol/L      CO2   Date Value Ref Range Status   10/09/2017 29.5 22.8 - 31.6 mmol/L      Glucose   Date Value Ref Range Status   10/09/2017 87 70 - 99 mg/dL      BUN, Bld   Date Value Ref Range Status   10/09/2017 14 8 - 20 mg/dL      Creatinine   Date Value Ref Range Status   10/09/2017 0.63 0.60 - 1.40 mg/dL      Calcium   Date Value Ref Range Status   10/09/2017 9.6 7.7 - 10.4 mg/dL      Total Protein   Date Value Ref Range Status   10/09/2017 7.2 6.0 - 8.2 g/dL      Albumin   Date Value Ref Range Status   10/09/2017 4.3 3.1 - 4.7 g/dL      Total Bilirubin   Date Value Ref Range Status   10/09/2017 0.7 0.3 - 1.0 mg/dL      Alkaline Phosphatase   Date Value Ref Range Status "   10/09/2017 63 40 - 104 IU/L      AST   Date Value Ref Range Status   10/09/2017 17 10 - 40 IU/L      ALT   Date Value Ref Range Status   07/13/2017 13 6 - 29 U/L Final     eGFR if    Date Value Ref Range Status   07/13/2017 103 > OR = 60 mL/min/1.73m2 Final     eGFR if non    Date Value Ref Range Status   07/13/2017 89 > OR = 60 mL/min/1.73m2 Final     I have reviewed all available lab results and radiology reports.    Radiology/Diagnostic Studies:    PET no metastatic dx, no NHL seen, SONYA.  10/2017.    Assessment:   (1) 62 y.o. female with diagnosis of  Bilateral breast cancer 2005, low grade NHL 2013.    No evidence for recurrent breast cancer or NHL.  Observe for now.  RTC 6 months.

## 2018-07-23 ENCOUNTER — TELEPHONE (OUTPATIENT)
Dept: HEMATOLOGY/ONCOLOGY | Facility: CLINIC | Age: 63
End: 2018-07-23

## 2018-07-23 NOTE — TELEPHONE ENCOUNTER
----- Message from Arabella Arriaga sent at 7/23/2018  9:12 AM CDT -----  Patient called in requesting Port Flush Orders to please be sent to [facility]. She is requesting them to be good for a year and to have it done every 6 weeks weeks. Please contact patient once sent to [phone]. Thanks

## 2018-07-23 NOTE — TELEPHONE ENCOUNTER
Called pt to let her know that I faxed her port flush orders over to [facility].

## 2018-07-24 ENCOUNTER — TELEPHONE (OUTPATIENT)
Dept: HEMATOLOGY/ONCOLOGY | Facility: CLINIC | Age: 63
End: 2018-07-24

## 2018-07-24 NOTE — TELEPHONE ENCOUNTER
----- Message from Arabella Arriaga sent at 7/23/2018  4:25 PM CDT -----  Aditi with [facility] called in stating that she received the port flush orders on the patient, however it needs to have a start and end date. They will not accept it without it. Please fix order and refax. Thanks

## 2018-07-26 ENCOUNTER — HOSPITAL ENCOUNTER (OUTPATIENT)
Dept: RADIOLOGY | Facility: HOSPITAL | Age: 63
Discharge: HOME | End: 2018-07-26
Attending: NURSE PRACTITIONER
Payer: MEDICARE

## 2018-07-26 DIAGNOSIS — M79.672 LEFT FOOT PAIN: ICD-10-CM

## 2018-07-26 PROCEDURE — 73630 X-RAY EXAM OF FOOT: CPT | Mod: 26,LT,, | Performed by: RADIOLOGY

## 2018-07-26 PROCEDURE — 73630 X-RAY EXAM OF FOOT: CPT | Mod: TC,FY,LT

## 2018-08-01 ENCOUNTER — INFUSION (OUTPATIENT)
Dept: INFUSION THERAPY | Facility: HOSPITAL | Age: 63
End: 2018-08-01
Payer: MEDICARE

## 2018-08-01 VITALS
TEMPERATURE: 99 F | RESPIRATION RATE: 17 BRPM | HEART RATE: 90 BPM | DIASTOLIC BLOOD PRESSURE: 79 MMHG | SYSTOLIC BLOOD PRESSURE: 136 MMHG

## 2018-08-01 DIAGNOSIS — Z85.3 HISTORY OF BREAST CANCER: ICD-10-CM

## 2018-08-01 PROCEDURE — 96523 IRRIG DRUG DELIVERY DEVICE: CPT

## 2018-08-01 PROCEDURE — 99211 OFF/OP EST MAY X REQ PHY/QHP: CPT

## 2018-08-01 RX ORDER — HEPARIN 100 UNIT/ML
500 SYRINGE INTRAVENOUS
Status: CANCELLED | OUTPATIENT
Start: 2018-08-01

## 2018-08-01 RX ORDER — SODIUM CHLORIDE 0.9 % (FLUSH) 0.9 %
10 SYRINGE (ML) INJECTION
Status: CANCELLED | OUTPATIENT
Start: 2018-08-01

## 2018-08-01 RX ORDER — HEPARIN SODIUM,PORCINE/PF 10 UNIT/ML
50 SYRINGE (ML) INTRAVENOUS
Status: ACTIVE | OUTPATIENT
Start: 2018-08-01 | End: 2019-07-27

## 2018-08-08 PROBLEM — K30 ACID INDIGESTION: Status: ACTIVE | Noted: 2018-08-08

## 2018-09-03 ENCOUNTER — INFUSION (OUTPATIENT)
Dept: INFUSION THERAPY | Facility: HOSPITAL | Age: 63
End: 2018-09-03
Payer: MEDICARE

## 2018-09-03 VITALS
SYSTOLIC BLOOD PRESSURE: 128 MMHG | DIASTOLIC BLOOD PRESSURE: 78 MMHG | OXYGEN SATURATION: 99 % | WEIGHT: 140 LBS | SYSTOLIC BLOOD PRESSURE: 150 MMHG | BODY MASS INDEX: 25.76 KG/M2 | SYSTOLIC BLOOD PRESSURE: 128 MMHG | HEART RATE: 75 BPM | TEMPERATURE: 97 F | HEIGHT: 62 IN | DIASTOLIC BLOOD PRESSURE: 78 MMHG | DIASTOLIC BLOOD PRESSURE: 95 MMHG | TEMPERATURE: 97 F | RESPIRATION RATE: 17 BRPM | OXYGEN SATURATION: 99 % | HEART RATE: 78 BPM

## 2018-09-03 DIAGNOSIS — Z85.3 HISTORY OF BREAST CANCER: Primary | ICD-10-CM

## 2018-09-03 PROCEDURE — 99211 OFF/OP EST MAY X REQ PHY/QHP: CPT | Mod: 25

## 2018-09-03 PROCEDURE — 96523 IRRIG DRUG DELIVERY DEVICE: CPT

## 2018-09-03 NOTE — PROGRESS NOTES
"1000 PATIENT TO OPT VITAL SIGNS TAKEN RB RN  1010 ACCESSED RIGHT CHEST INFUSAPORT USING STERILE TECHINQUE. 20 GAUGE GARDINER 1" NEEDLE USED FLUSHED WITH 10CC HEPLOCK FLUSH AND 5CC HEPARIN PATIENT STATES THAT SHE CAN TASTE HEPARIN. REMOVED GARDINER NEEDLE AND BANDAIDE PLACED OVER SITE. RB RN  1015 PATIENT LEFT AMBULATING TO PRIVATE AUTO ALONE. RB RN  "

## 2018-10-08 ENCOUNTER — INFUSION (OUTPATIENT)
Dept: INFUSION THERAPY | Facility: HOSPITAL | Age: 63
End: 2018-10-08
Attending: INTERNAL MEDICINE
Payer: MEDICARE

## 2018-10-08 VITALS
SYSTOLIC BLOOD PRESSURE: 122 MMHG | HEART RATE: 72 BPM | BODY MASS INDEX: 25.76 KG/M2 | RESPIRATION RATE: 18 BRPM | HEIGHT: 62 IN | WEIGHT: 140 LBS | TEMPERATURE: 98 F | OXYGEN SATURATION: 96 % | DIASTOLIC BLOOD PRESSURE: 68 MMHG

## 2018-10-08 PROCEDURE — 63600175 PHARM REV CODE 636 W HCPCS: Performed by: INTERNAL MEDICINE

## 2018-10-08 PROCEDURE — 96523 IRRIG DRUG DELIVERY DEVICE: CPT

## 2018-10-08 RX ADMIN — HEPARIN, PORCINE (PF) 10 UNIT/ML INTRAVENOUS SYRINGE 50 UNITS: at 11:10

## 2018-10-08 NOTE — PROGRESS NOTES
1041 PATIENT TO OPT VITAL SIGNS TAKEN. RB RN  1055 ACCESSED RIGHT CHEST INFUSAPORT WITH 20 GAUGE 1INCH GARDINER NEEDLE USING STERILE TECHNIQUE.. FLUSHED WITH 10 CC HEPLOCK FLUSH AND 5 CC HEPARIN. PATIENT STATES THAT SHE CAN TASTE HEPARIN. REMOVED GARDINER NEEDLE FROM INFUSAPORT AND PLACED DRESSING OVER SITE. PATIENT TOLERATED WELL RB RN  1110 PATIENT LEFT AMBULATING TO PRIVATE AUTO WITH FRIEND. RENATO CORRALES

## 2018-11-06 ENCOUNTER — OFFICE VISIT (OUTPATIENT)
Dept: HEMATOLOGY/ONCOLOGY | Facility: CLINIC | Age: 63
End: 2018-11-06
Payer: MEDICARE

## 2018-11-06 VITALS
BODY MASS INDEX: 25.8 KG/M2 | HEART RATE: 78 BPM | HEIGHT: 62 IN | DIASTOLIC BLOOD PRESSURE: 66 MMHG | TEMPERATURE: 98 F | WEIGHT: 140.19 LBS | SYSTOLIC BLOOD PRESSURE: 128 MMHG

## 2018-11-06 DIAGNOSIS — C83.09 SMALL B-CELL LYMPHOMA OF EXTRANODAL SITE EXCLUDING SPLEEN AND OTHER SOLID ORGANS: Primary | ICD-10-CM

## 2018-11-06 PROCEDURE — 99214 OFFICE O/P EST MOD 30 MIN: CPT | Mod: ,,, | Performed by: INTERNAL MEDICINE

## 2018-11-06 NOTE — PATIENT INSTRUCTIONS
What Is Non-Hodgkin Lymphoma?    Cancer occurs when cells in the body begin to change and multiply out of control. These cells can form lumps of tissue called tumors. Lymphoma is cancer that starts in cells in the body's lymphatic system. Most types of cancer in the lymphatic system are non-Hodgkin lymphoma.  Understanding the lymphatic system  The lymphatic system helps your body fight disease and infections. This system is a network of tubes, or vessels. The vessels pass through tissue all over your body. A clear fluid called lymph flows through the vessels. Lymph helps the body fight infections. Small organs called lymph nodes are also found along this network of vessels. Lymph nodes are found throughout the body. Most are grouped together in the neck, underarms, and groin area. You may feel swollen or enlarged nodes in these areas when you have a cold.  Some of the bodys internal organs are also part of the lymphatic system. These organs include the bone marrow, spleen, thymus, and tonsils. Other organs, such as parts of the digestive tract, also have lymph tissue.  When non-Hodgkin lymphoma forms  The lymphatic system stretches over the whole body. So non-Hodgkin lymphoma can start almost anywhere in your body. Lymphoma can also spread from the lymphatic system to other tissues of the body. This spread is called metastasis.  Treatment options for non-Hodgkin lymphoma  You and your healthcare provider will discuss a treatment plan thats best for your needs. Treatment options may include:  · Radiation therapy. This uses focused rays of energy to kill cancer cells.  · Chemotherapy. This uses strong medicines to kill cancer cells.  · Immunotherapy. This uses the bodys own immune system to help fight cancer.  · Targeted therapy. This uses medicines that target parts of cancer cells that make them different from normal cells.  · Stem cell transplant. This kills cancer cells with high doses of chemotherapy and  radiation.  Date Last Reviewed: 7/1/2015  © 7706-6756 [employer]. [address] [place] [address], [place] [address]. All rights reserved. This information is not intended as a substitute for professional medical care. Always follow your healthcare professional's instructions.

## 2018-11-06 NOTE — LETTER
November 6, 2018      Bharath Pierre III, MD  [address]  [place] [address]           [facility]  [address]  [address] [place] [address]  Phone: [phone]  Fax: [phone]          Patient: Fanta Berg   MR Number: [id number]   YOB: 1955   Date of Visit: 11/6/2018       Dear Dr. Bharath Pierre III:    Thank you for referring Fanta Berg to me for evaluation. Attached you will find relevant portions of my assessment and plan of care.    If you have questions, please do not hesitate to call me. I look forward to following Fanta Berg along with you.    Sincerely,    PRIYANK Noonan MD    Enclosure  CC:  No Recipients    If you would like to receive this communication electronically, please contact [email or web address] or [phone] to request more information on M-DAQ Link access.    For providers and/or their staff who would like to refer a patient to [facility], please contact us through our one-stop-shop provider referral line, [facility], at [phone].    If you feel you have received this communication in error or would no longer like to receive these types of communications, please e-mail [email or web address]

## 2018-11-07 NOTE — PROGRESS NOTES
[facility] History & Physical    Subjective:      Patient ID:   Fanta Berg  63 y.o. female  1955  [place]      Chief Complaint:  Cancer follow up, HA x's    Hx of bilateral breast cancer.  Bilateral mastectomy with reconstruction.  Adjuvant tamoxifen x's 5 years.    She has GERD sx, due for U/S.  Sx decreased on Zantac.    MGUS . Followed at [facility].    Hx small cell NHL, treated with -R x's 6, then Rituxan maintenance.  Had neuro? Sx after Rituxan 2015, Rituxan D/C ed. Low grade NHL.  Follicular vs marginal NHL.    Chronic LBP sx 2nd DDD. S/P epidural injections.  Chronic neck pains, hx C spine fx.  On PT.    Hx HTN, rheumatoid fever as a child, GERD, arthritis sx, DDD.    Ovarian tumor removed at [facility] 2005.  R MRM   L mastectomy   T & A  Back surgery   C section x's 3  Breast implants L & R 2006  Port removed 2012  Complete hysterectomy [facility]  M0    Allergy benadryl, codiene, levaquin    Smoke rarely, ETOH no, Job , disabled    Mom small cell lung cancer  Dad pancreatic cancer  Sister HTN  Son AMI  Daughter schizophrenia  Daughter panic attacks  Mom, sisters, daughters no breast cancer    ROS:   GEN: normal without any fever, night sweats or weight loss  HEENT:See HPI.   sore throat, stiff neck, changes in vision  CV: RF as a child,     no CP, SOB, PND, MACHADO or orthopnea  PULM: normal with no SOB, cough, hemoptysis, sputum or pleuritic pain  GI: GERD+.   no abdominal pain, nausea, vomiting, constipation, diarrhea, melanotic stools, BRBPR, or hematemesis  : normal with no hematuria, dysuria  BREAST: See HPI  SKIN: normal with no rash, erythema, bruising, or swelling     Past Medical History:   Diagnosis Date    Breast cancer     Breast cancer     bilat mast, chemo, arimidex     [facility]    Cancer     Cardiomyopathy due to chemotherapy     DDD (degenerative disc disease)     GERD (gastroesophageal reflux disease)     NHL (nodular histiocytic  "lymphoma)     Non Hodgkin's lymphoma     Non-Hodgkin lymphoma     Small cell B-cell lymphoma      Past Surgical History:   Procedure Laterality Date    BACK SURGERY      BREAST SURGERY       SECTION      HYSTERECTOMY      SPINE SURGERY      TONSILLECTOMY      TOTAL ABDOMINAL HYSTERECTOMY W/ BILATERAL SALPINGOOPHORECTOMY         Review of patient's allergies indicates:   Allergen Reactions    Benadryl [diphenhydramine hcl] Anaphylaxis    Levaquin [levofloxacin] Other (See Comments)     Permanent tendon & ligament damage    Ibuprofen-diphenhydramine cit Nausea Only      "Any meds with PM behind them "    Sulfamethoxazole-trimethoprim Nausea And Vomiting    Tolmetin Nausea Only    Codeine Palpitations and Rash    Duloxetine hcl Other (See Comments)     "increased sweating"     Social History     Socioeconomic History    Marital status: Single     Spouse name: Not on file    Number of children: Not on file    Years of education: Not on file    Highest education level: Not on file   Social Needs    Financial resource strain: Not on file    Food insecurity - worry: Not on file    Food insecurity - inability: Not on file    Transportation needs - medical: Not on file    Transportation needs - non-medical: Not on file   Occupational History    Not on file   Tobacco Use    Smoking status: Former Smoker     Last attempt to quit: 2012     Years since quittin.8    Smokeless tobacco: Never Used   Substance and Sexual Activity    Alcohol use: No    Drug use: No    Sexual activity: Not on file   Other Topics Concern    Not on file   Social History Narrative    Not on file         Current Outpatient Medications:     acyclovir 5% (ZOVIRAX) 5 % ointment, Apply topically 3 (three) times daily as needed., Disp: 30 g, Rfl: 3    fluticasone (FLONASE) 50 mcg/actuation nasal spray, 1 spray (50 mcg total) by Each Nare route daily as needed for Rhinitis., Disp: 16 g, Rfl: 2    " "hydrocodone-acetaminophen 10-325mg (NORCO)  mg Tab, Take 1 tablet by mouth 3 (three) times daily., Disp: , Rfl:     mupirocin (BACTROBAN) 2 % ointment, Apply topically 3 (three) times daily as needed., Disp: 22 g, Rfl: 2    ranitidine (ZANTAC) 150 MG tablet, Take 1 tablet (150 mg total) by mouth 2 (two) times daily., Disp: 60 tablet, Rfl: 2    silver sulfADIAZINE 1% (SILVADENE) 1 % cream, Apply topically 2 (two) times daily as needed., Disp: 50 g, Rfl: 0    Current Facility-Administered Medications:     heparin, porcine (PF) injection 50 Units, 50 Units, Intravenous, Q30 Days, PRIYANK Noonan MD, 50 Units at 10/08/18 1105          Objective:   Vitals:  Blood pressure 128/66, pulse 78, temperature 98.3 °F (36.8 °C), height 5' 2" (1.575 m), weight 63.6 kg (140 lb 3.2 oz).    Physical Examination:   GEN: no apparent distress, comfortable  HEAD: atraumatic and normocephalic  EYES: no pallor, no icterus  ENT: no pharyngeal erythema, external ears WNL; no nasal discharge; no thrush  NECK: no masses, thyroid normal, trachea midline, no LAD/LN's, supple  CV: RRR with no murmur; normal pulse; normal S1 and S2; no pedal edema  CHEST: Normal respiratory effort; CTAB; normal breath sounds; no wheeze or crackles  ABDOM: nontender and nondistended; soft;  no rebound/guarding  MUSC/Skeletal: ROM normal; no crepitus; joints normal  EXTREM: no clubbing, cyanosis, inflammation or swelling  SKIN: no rashes, lesions, ulcers, petechiae  : no CVAT  NEURO: grossly intact; motor/sensory WNL;  no tremors  PSYCH: normal mood, affect and behavior  LYMPH: normal cervical, supraclavicular, axillary and groin LN's  "BREASTS": no palpable mass      Labs:   Lab Results   Component Value Date    WBC 6.7 10/04/2018    HGB 14.6 10/04/2018    HCT 43.1 10/04/2018    MCV 89.4 10/04/2018     10/04/2018    CMP  Sodium   Date Value Ref Range Status   06/06/2018 139 135 - 146 mmol/L Final   10/09/2017 137 134 - 144 mmol/L      Potassium "   Date Value Ref Range Status   06/06/2018 3.9 3.5 - 5.3 mmol/L Final     Chloride   Date Value Ref Range Status   06/06/2018 103 98 - 110 mmol/L Final   10/09/2017 100 98 - 110 mmol/L      CO2   Date Value Ref Range Status   06/06/2018 31 20 - 31 mmol/L Final     Glucose   Date Value Ref Range Status   06/06/2018 125 (H) 65 - 99 mg/dL Final     Comment:                   Fasting reference interval     For someone without known diabetes, a glucose value  between 100 and 125 mg/dL is consistent with  prediabetes and should be confirmed with a  follow-up test.        10/09/2017 87 70 - 99 mg/dL      BUN, Bld   Date Value Ref Range Status   06/06/2018 10 7 - 25 mg/dL Final     Creatinine   Date Value Ref Range Status   06/06/2018 0.68 0.50 - 0.99 mg/dL Final     Comment:     For patients >49 years of age, the reference limit  for Creatinine is approximately 13% higher for people  identified as -American.        10/09/2017 0.63 0.60 - 1.40 mg/dL      Calcium   Date Value Ref Range Status   06/06/2018 9.3 8.6 - 10.4 mg/dL Final     Total Protein   Date Value Ref Range Status   06/06/2018 6.3 6.1 - 8.1 g/dL Final     Albumin   Date Value Ref Range Status   06/06/2018 4.2 3.6 - 5.1 g/dL Final   10/09/2017 4.3 3.1 - 4.7 g/dL      Total Bilirubin   Date Value Ref Range Status   06/06/2018 0.3 0.2 - 1.2 mg/dL Final     Alkaline Phosphatase   Date Value Ref Range Status   06/06/2018 72 33 - 130 U/L Final     AST   Date Value Ref Range Status   06/06/2018 17 10 - 35 U/L Final     ALT   Date Value Ref Range Status   06/06/2018 11 6 - 29 U/L Final     eGFR if    Date Value Ref Range Status   06/06/2018 107 > OR = 60 mL/min/1.73m2 Final     eGFR if non    Date Value Ref Range Status   06/06/2018 92 > OR = 60 mL/min/1.73m2 Final     PET scan and lab ordered for now.    Radiology/Diagnostic Studies:    PET no metastatic dx, no NHL seen, SONYA.  10/2017.    Assessment:   (1) 63 y.o. female with  diagnosis of  Bilateral breast cancer 2005, low grade NHL 2013.    No evidence for recurrent breast cancer or NHL. On last Pet 2017.  Check PET now.  RTC 1 month, can cancel.  RTC 6 months.

## 2018-12-06 ENCOUNTER — INFUSION (OUTPATIENT)
Dept: INFUSION THERAPY | Facility: HOSPITAL | Age: 63
End: 2018-12-06
Payer: MEDICARE

## 2018-12-06 VITALS — BODY MASS INDEX: 25.76 KG/M2 | HEIGHT: 62 IN | WEIGHT: 140 LBS

## 2018-12-06 VITALS
HEART RATE: 77 BPM | DIASTOLIC BLOOD PRESSURE: 78 MMHG | TEMPERATURE: 97 F | SYSTOLIC BLOOD PRESSURE: 138 MMHG | RESPIRATION RATE: 20 BRPM

## 2018-12-06 PROCEDURE — 96523 IRRIG DRUG DELIVERY DEVICE: CPT

## 2018-12-06 NOTE — PROGRESS NOTES
0945:  Patient arrived to OPT ambulatory and alone.  Here today for Port flush.  VS. Stable.  Denies pain.  See flow sheet document for complete assessment and discharge.  Port heparin locked with 5mls heparin=50 units  IV  per MD orders.

## 2019-01-25 ENCOUNTER — INFUSION (OUTPATIENT)
Dept: INFUSION THERAPY | Facility: HOSPITAL | Age: 64
End: 2019-01-25
Attending: INTERNAL MEDICINE
Payer: MEDICARE

## 2019-01-25 VITALS
DIASTOLIC BLOOD PRESSURE: 71 MMHG | SYSTOLIC BLOOD PRESSURE: 116 MMHG | RESPIRATION RATE: 20 BRPM | TEMPERATURE: 99 F | HEART RATE: 87 BPM

## 2019-01-25 PROCEDURE — 63600175 PHARM REV CODE 636 W HCPCS: Performed by: INTERNAL MEDICINE

## 2019-01-25 PROCEDURE — 96523 IRRIG DRUG DELIVERY DEVICE: CPT

## 2019-01-25 RX ORDER — HEPARIN 100 UNIT/ML
50 SYRINGE INTRAVENOUS
Status: CANCELLED | OUTPATIENT
Start: 2019-01-25 | End: 2019-01-25

## 2019-01-25 RX ORDER — SODIUM CHLORIDE 0.9 % (FLUSH) 0.9 %
10 SYRINGE (ML) INJECTION
Status: CANCELLED | OUTPATIENT
Start: 2019-01-25

## 2019-01-25 RX ORDER — HEPARIN 100 UNIT/ML
500 SYRINGE INTRAVENOUS
Status: CANCELLED | OUTPATIENT
Start: 2019-01-25

## 2019-01-25 RX ADMIN — HEPARIN, PORCINE (PF) 10 UNIT/ML INTRAVENOUS SYRINGE 50 UNITS: at 04:01

## 2019-02-22 ENCOUNTER — INFUSION (OUTPATIENT)
Dept: INFUSION THERAPY | Facility: HOSPITAL | Age: 64
End: 2019-02-22
Attending: INTERNAL MEDICINE
Payer: MEDICARE

## 2019-02-22 VITALS
TEMPERATURE: 99 F | HEART RATE: 88 BPM | RESPIRATION RATE: 20 BRPM | SYSTOLIC BLOOD PRESSURE: 121 MMHG | DIASTOLIC BLOOD PRESSURE: 76 MMHG

## 2019-02-22 DIAGNOSIS — Z85.3 HISTORY OF BREAST CANCER: Primary | ICD-10-CM

## 2019-02-22 PROCEDURE — A4216 STERILE WATER/SALINE, 10 ML: HCPCS | Performed by: INTERNAL MEDICINE

## 2019-02-22 PROCEDURE — 96523 IRRIG DRUG DELIVERY DEVICE: CPT

## 2019-02-22 PROCEDURE — 63600175 PHARM REV CODE 636 W HCPCS: Performed by: INTERNAL MEDICINE

## 2019-02-22 PROCEDURE — 25000003 PHARM REV CODE 250: Performed by: INTERNAL MEDICINE

## 2019-02-22 RX ORDER — SODIUM CHLORIDE 0.9 % (FLUSH) 0.9 %
10 SYRINGE (ML) INJECTION
Status: COMPLETED | OUTPATIENT
Start: 2019-02-22 | End: 2019-02-22

## 2019-02-22 RX ORDER — HEPARIN SODIUM,PORCINE/PF 10 UNIT/ML
50 SYRINGE (ML) INTRAVENOUS
Status: DISCONTINUED | OUTPATIENT
Start: 2019-02-22 | End: 2019-02-22 | Stop reason: HOSPADM

## 2019-02-22 RX ORDER — HEPARIN 100 UNIT/ML
50 SYRINGE INTRAVENOUS
Status: CANCELLED | OUTPATIENT
Start: 2019-02-22 | End: 2019-02-22

## 2019-02-22 RX ORDER — SODIUM CHLORIDE 0.9 % (FLUSH) 0.9 %
10 SYRINGE (ML) INJECTION
Status: CANCELLED | OUTPATIENT
Start: 2019-02-22

## 2019-02-22 RX ADMIN — HEPARIN, PORCINE (PF) 10 UNIT/ML INTRAVENOUS SYRINGE 50 UNITS: at 01:02

## 2019-02-22 RX ADMIN — Medication 10 ML: at 01:02

## 2019-04-04 ENCOUNTER — INFUSION (OUTPATIENT)
Dept: INFUSION THERAPY | Facility: HOSPITAL | Age: 64
End: 2019-04-04
Attending: INTERNAL MEDICINE
Payer: MEDICARE

## 2019-04-04 VITALS
DIASTOLIC BLOOD PRESSURE: 83 MMHG | HEART RATE: 75 BPM | TEMPERATURE: 98 F | SYSTOLIC BLOOD PRESSURE: 127 MMHG | RESPIRATION RATE: 20 BRPM

## 2019-04-04 DIAGNOSIS — Z85.3 HISTORY OF BREAST CANCER: Primary | ICD-10-CM

## 2019-04-04 PROCEDURE — A4216 STERILE WATER/SALINE, 10 ML: HCPCS | Performed by: INTERNAL MEDICINE

## 2019-04-04 PROCEDURE — 96523 IRRIG DRUG DELIVERY DEVICE: CPT

## 2019-04-04 PROCEDURE — 25000003 PHARM REV CODE 250: Performed by: INTERNAL MEDICINE

## 2019-04-04 PROCEDURE — 63600175 PHARM REV CODE 636 W HCPCS: Performed by: INTERNAL MEDICINE

## 2019-04-04 RX ORDER — SODIUM CHLORIDE 0.9 % (FLUSH) 0.9 %
10 SYRINGE (ML) INJECTION
Status: COMPLETED | OUTPATIENT
Start: 2019-04-04 | End: 2019-04-04

## 2019-04-04 RX ORDER — HEPARIN 100 UNIT/ML
50 SYRINGE INTRAVENOUS
Status: CANCELLED | OUTPATIENT
Start: 2019-04-04

## 2019-04-04 RX ORDER — SODIUM CHLORIDE 0.9 % (FLUSH) 0.9 %
10 SYRINGE (ML) INJECTION
Status: CANCELLED | OUTPATIENT
Start: 2019-04-04

## 2019-04-04 RX ORDER — HEPARIN SODIUM,PORCINE/PF 10 UNIT/ML
50 SYRINGE (ML) INTRAVENOUS
Status: DISCONTINUED | OUTPATIENT
Start: 2019-04-04 | End: 2019-04-04 | Stop reason: HOSPADM

## 2019-04-04 RX ADMIN — Medication 10 ML: at 09:04

## 2019-04-04 RX ADMIN — HEPARIN, PORCINE (PF) 10 UNIT/ML INTRAVENOUS SYRINGE 50 UNITS: at 09:04

## 2019-04-05 ENCOUNTER — TELEPHONE (OUTPATIENT)
Dept: HEMATOLOGY/ONCOLOGY | Facility: CLINIC | Age: 64
End: 2019-04-05

## 2019-04-05 NOTE — TELEPHONE ENCOUNTER
----- Message from Lori Meadows sent at 4/4/2019 10:52 AM CDT -----  Contact: Flori, Outpatient Eun Roberts  Please call Flori regarding patient orders.     # [phone]

## 2019-05-30 ENCOUNTER — TELEPHONE (OUTPATIENT)
Dept: HEMATOLOGY/ONCOLOGY | Facility: CLINIC | Age: 64
End: 2019-05-30

## 2019-06-03 RX ORDER — HEPARIN 100 UNIT/ML
50 SYRINGE INTRAVENOUS
Status: CANCELLED | OUTPATIENT
Start: 2019-06-03

## 2019-06-03 RX ORDER — SODIUM CHLORIDE 0.9 % (FLUSH) 0.9 %
10 SYRINGE (ML) INJECTION
Status: CANCELLED | OUTPATIENT
Start: 2019-06-03

## 2019-06-07 ENCOUNTER — INFUSION (OUTPATIENT)
Dept: INFUSION THERAPY | Facility: HOSPITAL | Age: 64
End: 2019-06-07
Attending: INTERNAL MEDICINE
Payer: MEDICARE

## 2019-06-07 VITALS
TEMPERATURE: 98 F | RESPIRATION RATE: 20 BRPM | SYSTOLIC BLOOD PRESSURE: 132 MMHG | HEART RATE: 69 BPM | DIASTOLIC BLOOD PRESSURE: 78 MMHG

## 2019-06-07 DIAGNOSIS — Z85.3 HISTORY OF BREAST CANCER: Primary | ICD-10-CM

## 2019-06-07 PROCEDURE — 63600175 PHARM REV CODE 636 W HCPCS: Performed by: INTERNAL MEDICINE

## 2019-06-07 PROCEDURE — 96523 IRRIG DRUG DELIVERY DEVICE: CPT

## 2019-06-07 RX ORDER — SODIUM CHLORIDE 0.9 % (FLUSH) 0.9 %
10 SYRINGE (ML) INJECTION
Status: DISCONTINUED | OUTPATIENT
Start: 2019-06-07 | End: 2019-06-07 | Stop reason: HOSPADM

## 2019-06-07 RX ORDER — SODIUM CHLORIDE 0.9 % (FLUSH) 0.9 %
10 SYRINGE (ML) INJECTION
Status: CANCELLED | OUTPATIENT
Start: 2019-06-07

## 2019-06-07 RX ORDER — HEPARIN SODIUM,PORCINE/PF 10 UNIT/ML
50 SYRINGE (ML) INTRAVENOUS
Status: DISCONTINUED | OUTPATIENT
Start: 2019-06-07 | End: 2019-06-07 | Stop reason: HOSPADM

## 2019-06-07 RX ORDER — HEPARIN 100 UNIT/ML
50 SYRINGE INTRAVENOUS
Status: CANCELLED | OUTPATIENT
Start: 2019-06-07

## 2019-06-07 RX ADMIN — HEPARIN, PORCINE (PF) 10 UNIT/ML INTRAVENOUS SYRINGE 50 UNITS: at 10:06

## 2019-06-11 ENCOUNTER — OFFICE VISIT (OUTPATIENT)
Dept: HEMATOLOGY/ONCOLOGY | Facility: CLINIC | Age: 64
End: 2019-06-11
Payer: MEDICARE

## 2019-06-11 VITALS
BODY MASS INDEX: 25.81 KG/M2 | DIASTOLIC BLOOD PRESSURE: 83 MMHG | WEIGHT: 141.13 LBS | SYSTOLIC BLOOD PRESSURE: 123 MMHG | TEMPERATURE: 99 F | RESPIRATION RATE: 20 BRPM | HEART RATE: 80 BPM

## 2019-06-11 DIAGNOSIS — C83.09 SMALL B-CELL LYMPHOMA OF EXTRANODAL SITE EXCLUDING SPLEEN AND OTHER SOLID ORGANS: Primary | ICD-10-CM

## 2019-06-11 PROCEDURE — 99214 OFFICE O/P EST MOD 30 MIN: CPT | Mod: ,,, | Performed by: INTERNAL MEDICINE

## 2019-06-11 PROCEDURE — 99214 PR OFFICE/OUTPT VISIT, EST, LEVL IV, 30-39 MIN: ICD-10-PCS | Mod: ,,, | Performed by: INTERNAL MEDICINE

## 2019-06-22 NOTE — PROGRESS NOTES
[facility] History & Physical    Subjective:      Patient ID:   Fanta Berg  63 y.o. female  1955  [place]      Chief Complaint:  Cancer follow up, HA x's    Hx of bilateral breast cancer.  Bilateral mastectomy with reconstruction.  Adjuvant tamoxifen x's 5 years.    She has GERD,  Sx decreased on Zantac.    MGUS . Followed at [facility].    Hx small cell NHL, treated with -R x's 6, then Rituxan maintenance.  Had neuro? Sx after Rituxan 2015, Rituxan D/C ed. Low grade NHL.  Follicular vs marginal NHL. .  PET 2018 SONYA.    Chronic LBP sx 2nd DDD. S/P epidural injections.  Chronic neck pains, hx C spine fx.  On PT.    Hx HTN, rheumatoid fever as a child, GERD, arthritis sx, DDD.    Ovarian tumor removed at [facility] 2005.  R MRM   L mastectomy   T & A  Back surgery   C section x's 3  Breast implants L & R 2006  Port removed 2012  Complete hysterectomy [facility]  M0    Allergy benadryl, codiene, levaquin    Smoke rarely, ETOH no, Job , disabled    Mom small cell lung cancer  Dad pancreatic cancer  Sister HTN  Son AMI  Daughter schizophrenia  Daughter panic attacks  Mom, sisters, daughters no breast cancer    ROS:   GEN: normal without any fever, night sweats or weight loss  HEENT:See HPI.   sore throat, stiff neck, changes in vision  CV: RF as a child,     no CP, SOB, PND, MACHADO or orthopnea  PULM: normal with no SOB, cough, hemoptysis, sputum or pleuritic pain  GI: GERD+.   no abdominal pain, nausea, vomiting, constipation, diarrhea, melanotic stools, BRBPR, or hematemesis  : normal with no hematuria, dysuria  BREAST: See HPI  SKIN: normal with no rash, erythema, bruising, or swelling     Past Medical History:   Diagnosis Date    Breast cancer     Breast cancer     bilat mast, chemo, arimidex     [facility]    Cancer     Cardiomyopathy due to chemotherapy     DDD (degenerative disc disease)     GERD (gastroesophageal reflux disease)     NHL (nodular  "histiocytic lymphoma)     Non Hodgkin's lymphoma     Non-Hodgkin lymphoma     Small cell B-cell lymphoma      Past Surgical History:   Procedure Laterality Date    BACK SURGERY      BREAST SURGERY       SECTION      HYSTERECTOMY      SPINE SURGERY      TONSILLECTOMY      TOTAL ABDOMINAL HYSTERECTOMY W/ BILATERAL SALPINGOOPHORECTOMY         Review of patient's allergies indicates:   Allergen Reactions    Benadryl [diphenhydramine hcl] Anaphylaxis    Levaquin [levofloxacin] Other (See Comments)     Permanent tendon & ligament damage    Ibuprofen-diphenhydramine cit Nausea Only      "Any meds with PM behind them "    Sulfamethoxazole-trimethoprim Nausea And Vomiting    Tolmetin Nausea Only    Codeine Palpitations and Rash    Duloxetine hcl Other (See Comments)     "increased sweating"         Current Outpatient Medications:     acyclovir 5% (ZOVIRAX) 5 % ointment, Apply topically 3 (three) times daily as needed., Disp: 30 g, Rfl: 3    fluticasone (FLONASE) 50 mcg/actuation nasal spray, 1 spray (50 mcg total) by Each Nare route daily as needed for Rhinitis., Disp: 16 g, Rfl: 2    mupirocin (BACTROBAN) 2 % ointment, Apply topically 3 (three) times daily as needed., Disp: 22 g, Rfl: 2    ranitidine (ZANTAC) 150 MG tablet, Take 1 tablet (150 mg total) by mouth 2 (two) times daily., Disp: 60 tablet, Rfl: 2    silver sulfADIAZINE 1% (SILVADENE) 1 % cream, Apply topically 2 (two) times daily as needed., Disp: 50 g, Rfl: 0    Current Facility-Administered Medications:     heparin, porcine (PF) injection 50 Units, 50 Units, Intravenous, Q30 Days, PRIYANK Noonan MD, 50 Units at 19 1014      Objective:   Vitals:  Blood pressure 123/83, pulse 80, temperature 98.7 °F (37.1 °C), temperature source Oral, resp. rate 20, weight 64 kg (141 lb 1.6 oz).    Physical Examination:   GEN: no apparent distress, comfortable  HEAD: atraumatic and normocephalic  EYES: no pallor, no icterus  ENT: no pharyngeal " "erythema, external ears WNL; no nasal discharge; no thrush  NECK: no masses, thyroid normal, trachea midline, no LAD/LN's, supple  CV: RRR with no murmur; normal pulse; normal S1 and S2; no pedal edema  CHEST: Normal respiratory effort; CTAB; normal breath sounds; no wheeze or crackles  ABDOM: nontender and nondistended; soft;  no rebound/guarding  MUSC/Skeletal: ROM normal; no crepitus; joints normal  EXTREM: no clubbing, cyanosis, inflammation or swelling  SKIN: no rashes, lesions, ulcers, petechiae  : no CVAT  NEURO: grossly intact; motor/sensory WNL;  no tremors  PSYCH: normal mood, affect and behavior  LYMPH: normal cervical, supraclavicular, axillary and groin LN's  "BREASTS": no palpable mass      Labs:   Lab Results   Component Value Date    WBC 7.6 04/10/2019    HGB 14.5 04/10/2019    HCT 42.8 04/10/2019    MCV 89.4 04/10/2019     04/10/2019    CMP  Sodium   Date Value Ref Range Status   04/10/2019 143 135 - 146 mmol/L Final   10/09/2017 137 134 - 144 mmol/L      Potassium   Date Value Ref Range Status   04/10/2019 4.0 3.5 - 5.3 mmol/L Final     Chloride   Date Value Ref Range Status   04/10/2019 106 98 - 110 mmol/L Final   10/09/2017 100 98 - 110 mmol/L      CO2   Date Value Ref Range Status   04/10/2019 19 (L) 20 - 32 mmol/L Final     Glucose   Date Value Ref Range Status   04/10/2019 130 (H) 65 - 99 mg/dL Final     Comment:                   Fasting reference interval     For someone without known diabetes, a glucose  value >125 mg/dL indicates that they may have  diabetes and this should be confirmed with a  follow-up test.        10/09/2017 87 70 - 99 mg/dL      BUN, Bld   Date Value Ref Range Status   04/10/2019 14 7 - 25 mg/dL Final     Creatinine   Date Value Ref Range Status   04/10/2019 0.81 0.50 - 0.99 mg/dL Final     Comment:     For patients >49 years of age, the reference limit  for Creatinine is approximately 13% higher for people  identified as -American.        10/09/2017 " 0.63 0.60 - 1.40 mg/dL      Calcium   Date Value Ref Range Status   04/10/2019 9.8 8.6 - 10.4 mg/dL Final     Total Protein   Date Value Ref Range Status   04/10/2019 6.8 6.1 - 8.1 g/dL Final     Albumin   Date Value Ref Range Status   04/10/2019 4.3 3.6 - 5.1 g/dL Final   10/09/2017 4.3 3.1 - 4.7 g/dL      Total Bilirubin   Date Value Ref Range Status   04/10/2019 0.4 0.2 - 1.2 mg/dL Final     Alkaline Phosphatase   Date Value Ref Range Status   04/10/2019 71 33 - 130 U/L Final     AST   Date Value Ref Range Status   04/10/2019 14 10 - 35 U/L Final     ALT   Date Value Ref Range Status   04/10/2019 10 6 - 29 U/L Final     eGFR if    Date Value Ref Range Status   04/10/2019 88 > OR = 60 mL/min/1.73m2 Final     eGFR if non    Date Value Ref Range Status   04/10/2019 76 > OR = 60 mL/min/1.73m2 Final     PET scan and lab ordered for now.    Radiology/Diagnostic Studies:    PET no metastatic dx, no NHL seen, SONYA 12/2018.    Assessment:   (1) 63 y.o. female with diagnosis of  Bilateral breast cancer 2005, low grade NHL 2013.    No evidence for recurrent breast cancer or NHL. On last Pet 12/2018.  Check PET 6 months.  RTC 6 months.

## 2019-07-12 ENCOUNTER — INFUSION (OUTPATIENT)
Dept: INFUSION THERAPY | Facility: HOSPITAL | Age: 64
End: 2019-07-12
Attending: INTERNAL MEDICINE
Payer: MEDICARE

## 2019-07-12 VITALS
WEIGHT: 144 LBS | BODY MASS INDEX: 26.34 KG/M2 | SYSTOLIC BLOOD PRESSURE: 117 MMHG | DIASTOLIC BLOOD PRESSURE: 68 MMHG | TEMPERATURE: 98 F | HEART RATE: 77 BPM | RESPIRATION RATE: 16 BRPM

## 2019-07-12 DIAGNOSIS — Z85.3 HISTORY OF BREAST CANCER: Primary | ICD-10-CM

## 2019-07-12 PROCEDURE — 63600175 PHARM REV CODE 636 W HCPCS: Performed by: INTERNAL MEDICINE

## 2019-07-12 PROCEDURE — A4216 STERILE WATER/SALINE, 10 ML: HCPCS | Performed by: INTERNAL MEDICINE

## 2019-07-12 PROCEDURE — 25000003 PHARM REV CODE 250: Performed by: INTERNAL MEDICINE

## 2019-07-12 PROCEDURE — 96523 IRRIG DRUG DELIVERY DEVICE: CPT

## 2019-07-12 RX ORDER — HEPARIN SODIUM,PORCINE/PF 10 UNIT/ML
50 SYRINGE (ML) INTRAVENOUS
Status: COMPLETED | OUTPATIENT
Start: 2019-07-12 | End: 2019-07-12

## 2019-07-12 RX ORDER — SODIUM CHLORIDE 0.9 % (FLUSH) 0.9 %
10 SYRINGE (ML) INJECTION
Status: CANCELLED | OUTPATIENT
Start: 2019-07-12

## 2019-07-12 RX ORDER — HEPARIN 100 UNIT/ML
50 SYRINGE INTRAVENOUS
Status: CANCELLED | OUTPATIENT
Start: 2019-07-12

## 2019-07-12 RX ORDER — SODIUM CHLORIDE 0.9 % (FLUSH) 0.9 %
10 SYRINGE (ML) INJECTION
Status: COMPLETED | OUTPATIENT
Start: 2019-07-12 | End: 2019-07-12

## 2019-07-12 RX ADMIN — HEPARIN, PORCINE (PF) 10 UNIT/ML INTRAVENOUS SYRINGE 50 UNITS: at 02:07

## 2019-07-12 RX ADMIN — Medication 10 ML: at 02:07

## 2019-08-09 ENCOUNTER — INFUSION (OUTPATIENT)
Dept: INFUSION THERAPY | Facility: HOSPITAL | Age: 64
End: 2019-08-09
Attending: INTERNAL MEDICINE
Payer: MEDICARE

## 2019-08-09 VITALS
HEART RATE: 86 BPM | DIASTOLIC BLOOD PRESSURE: 74 MMHG | TEMPERATURE: 98 F | RESPIRATION RATE: 16 BRPM | SYSTOLIC BLOOD PRESSURE: 128 MMHG | OXYGEN SATURATION: 97 %

## 2019-08-09 DIAGNOSIS — Z85.3 HISTORY OF BREAST CANCER: Primary | ICD-10-CM

## 2019-08-09 PROCEDURE — 96523 IRRIG DRUG DELIVERY DEVICE: CPT

## 2019-08-09 PROCEDURE — A4216 STERILE WATER/SALINE, 10 ML: HCPCS | Performed by: INTERNAL MEDICINE

## 2019-08-09 PROCEDURE — 25000003 PHARM REV CODE 250: Performed by: INTERNAL MEDICINE

## 2019-08-09 PROCEDURE — 63600175 PHARM REV CODE 636 W HCPCS: Performed by: INTERNAL MEDICINE

## 2019-08-09 RX ORDER — SODIUM CHLORIDE 0.9 % (FLUSH) 0.9 %
10 SYRINGE (ML) INJECTION
Status: CANCELLED | OUTPATIENT
Start: 2019-08-09

## 2019-08-09 RX ORDER — HEPARIN 100 UNIT/ML
50 SYRINGE INTRAVENOUS
Status: COMPLETED | OUTPATIENT
Start: 2019-08-09 | End: 2019-08-09

## 2019-08-09 RX ORDER — HEPARIN 100 UNIT/ML
50 SYRINGE INTRAVENOUS
Status: CANCELLED | OUTPATIENT
Start: 2019-08-09

## 2019-08-09 RX ORDER — SODIUM CHLORIDE 0.9 % (FLUSH) 0.9 %
10 SYRINGE (ML) INJECTION
Status: COMPLETED | OUTPATIENT
Start: 2019-08-09 | End: 2019-08-09

## 2019-08-09 RX ADMIN — Medication 10 ML: at 02:08

## 2019-08-09 RX ADMIN — Medication 50 UNITS: at 02:08

## 2019-08-09 NOTE — NURSING
"1405 Pt presented to the OPT dept for RCW port flush/pt is AAOx3/cooperative and pleasant with staff. DBRN.  8532-0220 RCW port accessed per protocol with a #20g 0.75" forrester needle/port flushed with NS/Hep flush per orders/line clamped and forrester needle removed with tip intact/straight. Drsg applied to site/pressure heldd x4 minutes due to bleeding at site/pt verbalized to RN that she had taken 2 ASA this morning. DBRN.  144 Pt left the OPT dept via amb to POV/home. DBRN.  "

## 2019-09-06 ENCOUNTER — HOSPITAL ENCOUNTER (OUTPATIENT)
Dept: RADIOLOGY | Facility: HOSPITAL | Age: 64
Discharge: HOME | End: 2019-09-06
Attending: INTERNAL MEDICINE
Payer: MEDICARE

## 2019-09-06 PROCEDURE — 71250 CT THORAX DX C-: CPT | Mod: 26,,, | Performed by: RADIOLOGY

## 2019-09-06 PROCEDURE — 71250 CT THORAX DX C-: CPT | Mod: TC

## 2019-09-06 PROCEDURE — 71250 CT CHEST WITHOUT CONTRAST: ICD-10-PCS | Mod: 26,,, | Performed by: RADIOLOGY

## 2019-09-13 ENCOUNTER — INFUSION (OUTPATIENT)
Dept: INFUSION THERAPY | Facility: HOSPITAL | Age: 64
End: 2019-09-13
Attending: INTERNAL MEDICINE
Payer: MEDICARE

## 2019-09-13 VITALS
SYSTOLIC BLOOD PRESSURE: 142 MMHG | DIASTOLIC BLOOD PRESSURE: 79 MMHG | RESPIRATION RATE: 15 BRPM | HEART RATE: 101 BPM | OXYGEN SATURATION: 98 %

## 2019-09-13 DIAGNOSIS — Z85.3 HISTORY OF BREAST CANCER: Primary | ICD-10-CM

## 2019-09-13 PROCEDURE — 96523 IRRIG DRUG DELIVERY DEVICE: CPT

## 2019-09-13 RX ORDER — HEPARIN 100 UNIT/ML
50 SYRINGE INTRAVENOUS
Status: DISCONTINUED | OUTPATIENT
Start: 2019-09-13 | End: 2019-09-13 | Stop reason: HOSPADM

## 2019-09-13 RX ORDER — HEPARIN 100 UNIT/ML
50 SYRINGE INTRAVENOUS
Status: CANCELLED | OUTPATIENT
Start: 2019-09-13

## 2019-09-13 RX ORDER — SODIUM CHLORIDE 0.9 % (FLUSH) 0.9 %
10 SYRINGE (ML) INJECTION
Status: DISCONTINUED | OUTPATIENT
Start: 2019-09-13 | End: 2019-09-13 | Stop reason: HOSPADM

## 2019-09-13 RX ORDER — SODIUM CHLORIDE 0.9 % (FLUSH) 0.9 %
10 SYRINGE (ML) INJECTION
Status: CANCELLED | OUTPATIENT
Start: 2019-09-13

## 2019-09-17 ENCOUNTER — HOSPITAL ENCOUNTER (OUTPATIENT)
Dept: RADIOLOGY | Facility: HOSPITAL | Age: 64
Discharge: HOME | End: 2019-09-17
Attending: NURSE PRACTITIONER
Payer: MEDICARE

## 2019-09-17 DIAGNOSIS — Z85.72 HISTORY OF NON-HODGKIN'S LYMPHOMA: ICD-10-CM

## 2019-09-17 DIAGNOSIS — C83.09 SMALL B-CELL LYMPHOMA OF EXTRANODAL SITE EXCLUDING SPLEEN AND OTHER SOLID ORGANS: ICD-10-CM

## 2019-09-17 DIAGNOSIS — R91.1 NODULE OF RIGHT LUNG: ICD-10-CM

## 2019-09-17 DIAGNOSIS — Z85.3 HISTORY OF BREAST CANCER: ICD-10-CM

## 2019-09-17 PROCEDURE — 78815 PET IMAGE W/CT SKULL-THIGH: CPT | Mod: 26,PS,, | Performed by: RADIOLOGY

## 2019-09-17 PROCEDURE — 78815 PET IMAGE W/CT SKULL-THIGH: CPT | Mod: TC,PO

## 2019-09-17 PROCEDURE — 78815 NM PET CT ROUTINE: ICD-10-PCS | Mod: 26,PS,, | Performed by: RADIOLOGY

## 2019-09-17 PROCEDURE — A9552 F18 FDG: HCPCS | Mod: PO

## 2019-09-24 PROBLEM — K30 ACID INDIGESTION: Status: RESOLVED | Noted: 2018-08-08 | Resolved: 2019-09-24

## 2019-10-11 ENCOUNTER — INFUSION (OUTPATIENT)
Dept: INFUSION THERAPY | Facility: HOSPITAL | Age: 64
End: 2019-10-11
Attending: INTERNAL MEDICINE
Payer: MEDICARE

## 2019-10-11 DIAGNOSIS — Z85.3 HISTORY OF BREAST CANCER: Primary | ICD-10-CM

## 2019-10-11 PROCEDURE — 96523 IRRIG DRUG DELIVERY DEVICE: CPT

## 2019-10-11 PROCEDURE — 63600175 PHARM REV CODE 636 W HCPCS: Performed by: INTERNAL MEDICINE

## 2019-10-11 RX ORDER — SODIUM CHLORIDE 0.9 % (FLUSH) 0.9 %
10 SYRINGE (ML) INJECTION
Status: CANCELLED | OUTPATIENT
Start: 2019-10-11

## 2019-10-11 RX ORDER — HEPARIN 100 UNIT/ML
50 SYRINGE INTRAVENOUS
Status: COMPLETED | OUTPATIENT
Start: 2019-10-11 | End: 2019-10-11

## 2019-10-11 RX ORDER — HEPARIN 100 UNIT/ML
50 SYRINGE INTRAVENOUS
Status: CANCELLED | OUTPATIENT
Start: 2019-10-11

## 2019-10-11 RX ORDER — SODIUM CHLORIDE 0.9 % (FLUSH) 0.9 %
10 SYRINGE (ML) INJECTION
Status: DISCONTINUED | OUTPATIENT
Start: 2019-10-11 | End: 2019-10-11 | Stop reason: HOSPADM

## 2019-10-11 RX ADMIN — Medication 50 UNITS: at 03:10

## 2019-11-10 ENCOUNTER — HOSPITAL ENCOUNTER (EMERGENCY)
Facility: HOSPITAL | Age: 64
Discharge: HOME | End: 2019-11-10
Attending: EMERGENCY MEDICINE
Payer: MEDICARE

## 2019-11-10 VITALS
TEMPERATURE: 98 F | BODY MASS INDEX: 27.23 KG/M2 | RESPIRATION RATE: 20 BRPM | HEIGHT: 62 IN | WEIGHT: 148 LBS | DIASTOLIC BLOOD PRESSURE: 96 MMHG | SYSTOLIC BLOOD PRESSURE: 146 MMHG | HEART RATE: 78 BPM | OXYGEN SATURATION: 99 %

## 2019-11-10 DIAGNOSIS — T30.0 BURN: Primary | ICD-10-CM

## 2019-11-10 PROCEDURE — 25000003 PHARM REV CODE 250: Performed by: NURSE PRACTITIONER

## 2019-11-10 PROCEDURE — 99283 EMERGENCY DEPT VISIT LOW MDM: CPT

## 2019-11-10 RX ORDER — CEPHALEXIN 250 MG/1
250 CAPSULE ORAL 4 TIMES DAILY
Qty: 28 CAPSULE | Refills: 0 | Status: SHIPPED | OUTPATIENT
Start: 2019-11-10 | End: 2019-11-17

## 2019-11-10 RX ORDER — CEPHALEXIN 250 MG/1
500 CAPSULE ORAL
Status: COMPLETED | OUTPATIENT
Start: 2019-11-10 | End: 2019-11-10

## 2019-11-10 RX ADMIN — CEPHALEXIN 500 MG: 250 CAPSULE ORAL at 06:11

## 2019-11-11 NOTE — ED PROVIDER NOTES
"Encounter Date: 11/10/2019       History     Chief Complaint   Patient presents with    Burn     Patient burned her right forearm on Saturday. Small dime size burn to right forearm.     Ambulatory to ED comes this delightful lady with history of bilat radical mastectomy who burned her arm on an oven rack 2 days ago and it is red and tender.          Review of patient's allergies indicates:   Allergen Reactions    Benadryl [diphenhydramine hcl] Anaphylaxis    Levaquin [levofloxacin] Other (See Comments)     PATIENT STATES THIS MEDICATION CAUSES HER JOINT PAIN AND WEAKNESS  n Tendinopathy  Tendon, Ligament damage from use after infection  Permanent tendon & ligament damage    Ibuprofen-diphenhydramine cit Nausea Only      "Any meds with PM behind them "    Sulfamethoxazole-trimethoprim Nausea And Vomiting    Tolmetin Nausea Only    Codeine Palpitations and Rash     Breathing difficulty    Duloxetine hcl Other (See Comments)     "increased sweating"     Past Medical History:   Diagnosis Date    Breast cancer     Breast cancer     bilat mast, chemo, arimidex     [facility]    Cancer     Cardiomyopathy due to chemotherapy     DDD (degenerative disc disease)     GERD (gastroesophageal reflux disease)     NHL (nodular histiocytic lymphoma)     Non Hodgkin's lymphoma     Non-Hodgkin lymphoma     Small cell B-cell lymphoma      Past Surgical History:   Procedure Laterality Date    BACK SURGERY      BREAST SURGERY       SECTION      HYSTERECTOMY      SPINE SURGERY      TONSILLECTOMY      TOTAL ABDOMINAL HYSTERECTOMY W/ BILATERAL SALPINGOOPHORECTOMY       Family History   Problem Relation Age of Onset    Lung cancer Mother     Brain cancer Mother     Liver cancer Father     Heart failure Son     Heart disease Son      Social History     Tobacco Use    Smoking status: Former Smoker     Last attempt to quit: 2012     Years since quittin.8    Smokeless tobacco: Never Used   Substance " Use Topics    Alcohol use: No    Drug use: No     Review of Systems   Constitutional: Negative.    HENT: Negative.    Eyes: Negative.    Respiratory: Negative.    Cardiovascular: Negative.    Gastrointestinal: Negative.    Endocrine: Negative.    Genitourinary: Negative.    Musculoskeletal: Negative.    Skin: Positive for wound.        vesicle with 2 cm surrounding erytehma   Allergic/Immunologic: Negative.    Neurological: Negative.    Hematological: Negative.    Psychiatric/Behavioral: Negative.    All other systems reviewed and are negative.      Physical Exam     Initial Vitals [11/10/19 1756]   BP Pulse Resp Temp SpO2   (!) 146/96 78 20 98.3 °F (36.8 °C) 99 %      MAP       --         Physical Exam    Nursing note and vitals reviewed.  Constitutional: She appears well-developed and well-nourished.   HENT:   Head: Normocephalic and atraumatic.   Nose: Nose normal.   Eyes: Conjunctivae and EOM are normal. Pupils are equal, round, and reactive to light.   Neck: Normal range of motion. Neck supple.   Cardiovascular: Normal rate, regular rhythm, normal heart sounds and intact distal pulses.   Pulmonary/Chest: Breath sounds normal.   Abdominal: Bowel sounds are normal.   Musculoskeletal: Normal range of motion.   Skin: Skin is warm and dry. Capillary refill takes 2 to 3 seconds.        Burn to R forearm as documented   Psychiatric: She has a normal mood and affect. Her behavior is normal. Judgment and thought content normal.         ED Course   Procedures  Labs Reviewed - No data to display       Imaging Results    None          Medical Decision Making:   Initial Assessment:   This is a superficial burn with a 1.5 cm radial border, no signs of vasculitis.    Differential Diagnosis:   Cellulitis, superficial burn  ED Management:  Due to her immunocompromised state I will cover her empirically with Keflex and have her follow up with Dr Pierre next week.                                   Clinical Impression:        ICD-10-CM ICD-9-CM   1. Burn T30.0 949.0                             Milton Parker NP  11/10/19 1415

## 2019-11-11 NOTE — DISCHARGE INSTRUCTIONS
Monitor for worsening, follow up with your primary doctor if the redness starts spreading.  Take ALL the antibiotics.  Tylenol or motrin for pain as needed.

## 2019-11-12 ENCOUNTER — INFUSION (OUTPATIENT)
Dept: INFUSION THERAPY | Facility: HOSPITAL | Age: 64
End: 2019-11-12
Attending: INTERNAL MEDICINE
Payer: MEDICARE

## 2019-11-12 VITALS
RESPIRATION RATE: 16 BRPM | HEART RATE: 71 BPM | SYSTOLIC BLOOD PRESSURE: 132 MMHG | DIASTOLIC BLOOD PRESSURE: 80 MMHG | OXYGEN SATURATION: 97 % | TEMPERATURE: 97 F

## 2019-11-12 DIAGNOSIS — Z85.3 HISTORY OF BREAST CANCER: Primary | ICD-10-CM

## 2019-11-12 PROCEDURE — 96523 IRRIG DRUG DELIVERY DEVICE: CPT

## 2019-11-12 PROCEDURE — 25000003 PHARM REV CODE 250: Performed by: INTERNAL MEDICINE

## 2019-11-12 PROCEDURE — 63600175 PHARM REV CODE 636 W HCPCS: Performed by: INTERNAL MEDICINE

## 2019-11-12 PROCEDURE — A4216 STERILE WATER/SALINE, 10 ML: HCPCS | Performed by: INTERNAL MEDICINE

## 2019-11-12 RX ORDER — SODIUM CHLORIDE 0.9 % (FLUSH) 0.9 %
10 SYRINGE (ML) INJECTION
Status: CANCELLED | OUTPATIENT
Start: 2019-11-12

## 2019-11-12 RX ORDER — SODIUM CHLORIDE 0.9 % (FLUSH) 0.9 %
10 SYRINGE (ML) INJECTION
Status: COMPLETED | OUTPATIENT
Start: 2019-11-12 | End: 2019-11-12

## 2019-11-12 RX ORDER — HEPARIN 100 UNIT/ML
50 SYRINGE INTRAVENOUS
Status: CANCELLED | OUTPATIENT
Start: 2019-11-12

## 2019-11-12 RX ORDER — HEPARIN 100 UNIT/ML
50 SYRINGE INTRAVENOUS
Status: COMPLETED | OUTPATIENT
Start: 2019-11-12 | End: 2019-11-12

## 2019-11-12 RX ADMIN — Medication 10 ML: at 08:11

## 2019-11-12 RX ADMIN — Medication 50 UNITS: at 08:11

## 2019-11-12 NOTE — PLAN OF CARE
Problem: Adult Inpatient Plan of Care  Goal: Plan of Care Review  Outcome: Ongoing, Progressing    /80 (Patient Position: Sitting)   Pulse 71   Temp 96.5 °F (35.8 °C)   Resp 16   SpO2 97%   Pt here today for port flush. VSS. RCW port accessed without difficulty. No blood return but patent. Hep locked. Port deaccessed and secured with bandaid. Pt verified appt for Dec 13, 2019. Ambulates without difficulty.

## 2019-11-13 ENCOUNTER — TELEPHONE (OUTPATIENT)
Dept: HEMATOLOGY/ONCOLOGY | Facility: CLINIC | Age: 64
End: 2019-11-13

## 2019-11-13 NOTE — TELEPHONE ENCOUNTER
Tell pt the PET from 9/17/19 did not show lymphoma or breast cancer.  She does not need repeat PET now.    Call [facility] and cancel the current PET scan study.    Keep appt 12/17/19.

## 2019-11-14 ENCOUNTER — TELEPHONE (OUTPATIENT)
Dept: HEMATOLOGY/ONCOLOGY | Facility: CLINIC | Age: 64
End: 2019-11-14

## 2019-11-14 NOTE — TELEPHONE ENCOUNTER
----- Message from Fatoumata Sherman sent at 11/11/2019  4:00 PM CST -----  Contact: patient  Please call patient is questioning whether or not she still needs PET scan. She had one ordered by Dr. Pierre in [place] in Sept but [facility] imaging said it was a different type of PET than what he is ordering. Please advise if it is necessary to schedule.    [phone]

## 2019-11-14 NOTE — TELEPHONE ENCOUNTER
Let pt know the PET from 9/17/19 did not show lymphoma or breast cancer.  She does not need repeat PET now.     Keep appt 12/17/19.

## 2019-11-26 ENCOUNTER — HOSPITAL ENCOUNTER (OUTPATIENT)
Dept: RADIOLOGY | Facility: HOSPITAL | Age: 64
Discharge: HOME | End: 2019-11-26
Attending: NURSE PRACTITIONER
Payer: MEDICARE

## 2019-11-26 DIAGNOSIS — M79.606 PAIN OF LOWER EXTREMITY, UNSPECIFIED LATERALITY: ICD-10-CM

## 2019-11-26 DIAGNOSIS — Z85.72 HISTORY OF NON-HODGKIN'S LYMPHOMA: ICD-10-CM

## 2019-11-26 DIAGNOSIS — M79.605 PAIN IN LEFT LEG: ICD-10-CM

## 2019-11-26 DIAGNOSIS — Z85.3 HISTORY OF BREAST CANCER: ICD-10-CM

## 2019-11-26 PROCEDURE — 93970 EXTREMITY STUDY: CPT | Mod: TC,PN

## 2019-11-26 PROCEDURE — 93970 US LOWER EXTREMITY VEINS BILATERAL: ICD-10-PCS | Mod: 26,,, | Performed by: RADIOLOGY

## 2019-11-26 PROCEDURE — 93970 EXTREMITY STUDY: CPT | Mod: 26,,, | Performed by: RADIOLOGY

## 2019-12-17 ENCOUNTER — OFFICE VISIT (OUTPATIENT)
Dept: HEMATOLOGY/ONCOLOGY | Facility: CLINIC | Age: 64
End: 2019-12-17
Payer: MEDICARE

## 2019-12-17 VITALS
RESPIRATION RATE: 18 BRPM | BODY MASS INDEX: 26.01 KG/M2 | HEART RATE: 80 BPM | DIASTOLIC BLOOD PRESSURE: 90 MMHG | WEIGHT: 142.19 LBS | SYSTOLIC BLOOD PRESSURE: 146 MMHG | TEMPERATURE: 98 F

## 2019-12-17 DIAGNOSIS — Z85.3 HISTORY OF BREAST CANCER: ICD-10-CM

## 2019-12-17 DIAGNOSIS — C83.09 SMALL B-CELL LYMPHOMA OF EXTRANODAL SITE EXCLUDING SPLEEN AND OTHER SOLID ORGANS: ICD-10-CM

## 2019-12-17 DIAGNOSIS — R91.1 NODULE OF MIDDLE LOBE OF RIGHT LUNG: Primary | ICD-10-CM

## 2019-12-17 PROCEDURE — 1159F MED LIST DOCD IN RCRD: CPT | Mod: S$GLB,,, | Performed by: INTERNAL MEDICINE

## 2019-12-17 PROCEDURE — 1125F PR PAIN SEVERITY QUANTIFIED, PAIN PRESENT: ICD-10-PCS | Mod: S$GLB,,, | Performed by: INTERNAL MEDICINE

## 2019-12-17 PROCEDURE — 1125F AMNT PAIN NOTED PAIN PRSNT: CPT | Mod: S$GLB,,, | Performed by: INTERNAL MEDICINE

## 2019-12-17 PROCEDURE — 99214 PR OFFICE/OUTPT VISIT, EST, LEVL IV, 30-39 MIN: ICD-10-PCS | Mod: S$GLB,,, | Performed by: INTERNAL MEDICINE

## 2019-12-17 PROCEDURE — 99214 OFFICE O/P EST MOD 30 MIN: CPT | Mod: S$GLB,,, | Performed by: INTERNAL MEDICINE

## 2019-12-17 PROCEDURE — 1159F PR MEDICATION LIST DOCUMENTED IN MEDICAL RECORD: ICD-10-PCS | Mod: S$GLB,,, | Performed by: INTERNAL MEDICINE

## 2019-12-18 ENCOUNTER — HOSPITAL ENCOUNTER (OUTPATIENT)
Dept: RADIOLOGY | Facility: HOSPITAL | Age: 64
Discharge: HOME | End: 2019-12-18
Attending: NURSE PRACTITIONER
Payer: MEDICARE

## 2019-12-18 ENCOUNTER — INFUSION (OUTPATIENT)
Dept: INFUSION THERAPY | Facility: HOSPITAL | Age: 64
End: 2019-12-18
Payer: MEDICARE

## 2019-12-18 VITALS — SYSTOLIC BLOOD PRESSURE: 174 MMHG | DIASTOLIC BLOOD PRESSURE: 79 MMHG | TEMPERATURE: 98 F | HEART RATE: 59 BPM

## 2019-12-18 DIAGNOSIS — Z85.3 HISTORY OF BREAST CANCER: ICD-10-CM

## 2019-12-18 DIAGNOSIS — R91.1 LUNG NODULE: ICD-10-CM

## 2019-12-18 DIAGNOSIS — C83.09 SMALL B-CELL LYMPHOMA OF EXTRANODAL SITE EXCLUDING SPLEEN AND OTHER SOLID ORGANS: ICD-10-CM

## 2019-12-18 DIAGNOSIS — Z85.3 HISTORY OF BREAST CANCER: Primary | ICD-10-CM

## 2019-12-18 DIAGNOSIS — R91.1 NODULE OF RIGHT LUNG: ICD-10-CM

## 2019-12-18 PROCEDURE — 71250 CT THORAX DX C-: CPT | Mod: TC

## 2019-12-18 PROCEDURE — A4216 STERILE WATER/SALINE, 10 ML: HCPCS | Performed by: INTERNAL MEDICINE

## 2019-12-18 PROCEDURE — 71250 CT THORAX DX C-: CPT | Mod: 26,,, | Performed by: RADIOLOGY

## 2019-12-18 PROCEDURE — 63600175 PHARM REV CODE 636 W HCPCS: Performed by: INTERNAL MEDICINE

## 2019-12-18 PROCEDURE — 71250 CT CHEST WITHOUT CONTRAST: ICD-10-PCS | Mod: 26,,, | Performed by: RADIOLOGY

## 2019-12-18 PROCEDURE — 96523 IRRIG DRUG DELIVERY DEVICE: CPT

## 2019-12-18 PROCEDURE — 25000003 PHARM REV CODE 250: Performed by: INTERNAL MEDICINE

## 2019-12-18 RX ORDER — HEPARIN 100 UNIT/ML
50 SYRINGE INTRAVENOUS
Status: COMPLETED | OUTPATIENT
Start: 2019-12-18 | End: 2019-12-18

## 2019-12-18 RX ORDER — SODIUM CHLORIDE 0.9 % (FLUSH) 0.9 %
10 SYRINGE (ML) INJECTION
Status: COMPLETED | OUTPATIENT
Start: 2019-12-18 | End: 2019-12-18

## 2019-12-18 RX ORDER — SODIUM CHLORIDE 0.9 % (FLUSH) 0.9 %
10 SYRINGE (ML) INJECTION
Status: CANCELLED | OUTPATIENT
Start: 2019-12-18

## 2019-12-18 RX ORDER — HEPARIN 100 UNIT/ML
50 SYRINGE INTRAVENOUS
Status: CANCELLED | OUTPATIENT
Start: 2019-12-18

## 2019-12-18 RX ADMIN — Medication 10 ML: at 11:12

## 2019-12-18 RX ADMIN — HEPARIN 50 UNITS: 100 SYRINGE at 11:12

## 2019-12-18 NOTE — PLAN OF CARE
Problem: Adult Inpatient Plan of Care  Goal: Plan of Care Review  Outcome: Ongoing, Progressing  BP (!) 174/79   Pulse (!) 59   Temp 97.9 °F (36.6 °C)   Patient arrived today for port flush. Port located on right chest. Accessed without difficulty, no blood return noted and patent. Heparin flushed. Deaccessed and covered with bandaid. AVS provided. Ambulates per self.

## 2019-12-18 NOTE — PROGRESS NOTES
[facility], formerly [facility] History & Physical    Subjective:      Patient ID:   Fanta Berg  64 y.o. female  1955  [place]      Chief Complaint:  Cancer follow up, HA x's    Hx of bilateral breast cancer.  Bilateral mastectomy with reconstruction.  Adjuvant tamoxifen x's 5 years.    She has GERD,  Sx decreased on Zantac.    MGUS . Followed at [facility].    Hx small cell NHL, treated with -R x's 6, then Rituxan maintenance.  Had neuro? Sx after Rituxan 2015, Rituxan D/C ed. Low grade NHL.  Follicular vs marginal NHL. .  PET 2018 SONYA.    Chronic LBP sx 2nd DDD. S/P epidural injections.  Chronic neck pains, hx C spine fx.  On PT.    Hx HTN, rheumatoid fever as a child, GERD, arthritis sx, DDD.    Ovarian tumor removed at [facility] 2005.  R MRM   L mastectomy   T & A  Back surgery   C section x's 3  Breast implants L & R 2006  Port removed 2012  Complete hysterectomy [facility]  M0    Allergy benadryl, codiene, levaquin    Smoke rarely, ETOH no, Job , disabled    Mom small cell lung cancer  Dad pancreatic cancer  Sister HTN  Son AMI  Daughter schizophrenia  Daughter panic attacks  Mom, sisters, daughters no breast cancer    ROS:   GEN: normal without any fever, night sweats or weight loss  HEENT:See HPI.   sore throat, stiff neck, changes in vision  CV: RF as a child,     no CP, SOB, PND, MACHADO or orthopnea  PULM: normal with no SOB, cough, hemoptysis, sputum or pleuritic pain  GI: GERD+.   no abdominal pain, nausea, vomiting, constipation, diarrhea, melanotic stools, BRBPR, or hematemesis  : normal with no hematuria, dysuria  BREAST: See HPI  SKIN: normal with no rash, erythema, bruising, or swelling     Past Medical History:   Diagnosis Date    Breast cancer     Breast cancer     bilat mast, chemo, arimidex     [facility]    Cancer     Cardiomyopathy due to chemotherapy     DDD (degenerative disc disease)     GERD (gastroesophageal reflux disease)     NHL (nodular histiocytic  "lymphoma)     Non Hodgkin's lymphoma     Non-Hodgkin lymphoma     Small cell B-cell lymphoma      Past Surgical History:   Procedure Laterality Date    BACK SURGERY      BREAST SURGERY       SECTION      HYSTERECTOMY      SPINE SURGERY      TONSILLECTOMY      TOTAL ABDOMINAL HYSTERECTOMY W/ BILATERAL SALPINGOOPHORECTOMY         Review of patient's allergies indicates:   Allergen Reactions    Benadryl [diphenhydramine hcl] Anaphylaxis    Levaquin [levofloxacin] Other (See Comments)     Permanent tendon & ligament damage    Ibuprofen-diphenhydramine cit Nausea Only      "Any meds with PM behind them "    Sulfamethoxazole-trimethoprim Nausea And Vomiting    Tolmetin Nausea Only    Codeine Palpitations and Rash    Duloxetine hcl Other (See Comments)     "increased sweating"         Current Outpatient Medications:     acyclovir 5% (ZOVIRAX) 5 % ointment, Apply topically 5 (five) times daily., Disp: 30 g, Rfl: 2    amoxicillin (AMOXIL) 875 MG tablet, Take 875 mg by mouth 2 (two) times daily., Disp: , Rfl:     azelastine (ASTELIN) 137 mcg (0.1 %) nasal spray, 1 spray by Nasal route 2 (two) times daily., Disp: , Rfl:     mupirocin (BACTROBAN) 2 % ointment, Apply topically 3 (three) times daily as needed., Disp: 22 g, Rfl: 2    pantoprazole (PROTONIX) 20 MG tablet, Take 1 tablet (20 mg total) by mouth once daily., Disp: 30 tablet, Rfl: 5    silver sulfADIAZINE 1% (SILVADENE) 1 % cream, Apply topically 2 (two) times daily as needed., Disp: 85 g, Rfl: 1      Objective:   Vitals:  Blood pressure (!) 146/90, pulse 80, temperature 98.2 °F (36.8 °C), temperature source Oral, resp. rate 18, weight 64.5 kg (142 lb 3.2 oz).    Physical Examination:   GEN: no apparent distress, comfortable  HEAD: atraumatic and normocephalic  EYES: no pallor, no icterus  ENT: no pharyngeal erythema, external ears WNL; no nasal discharge; no thrush  NECK: no masses, thyroid normal, trachea midline, no LAD/LN's, " "supple  CV: RRR with no murmur; normal pulse; normal S1 and S2; no pedal edema  CHEST: Normal respiratory effort; CTAB; normal breath sounds; no wheeze or crackles  ABDOM: nontender and nondistended; soft;  no rebound/guarding  MUSC/Skeletal: ROM normal; no crepitus; joints normal  EXTREM: no clubbing, cyanosis, inflammation or swelling  SKIN: no rashes, lesions, ulcers, petechiae  : no CVAT  NEURO: grossly intact; motor/sensory WNL;  no tremors  PSYCH: normal mood, affect and behavior  LYMPH: normal cervical, supraclavicular, axillary and groin LN's  "BREASTS": no palpable mass      Labs:   Lab Results   Component Value Date    WBC 8.1 09/05/2019    HGB 14.1 09/05/2019    HCT 41.9 09/05/2019    MCV 90.3 09/05/2019     09/05/2019    CMP  Sodium   Date Value Ref Range Status   09/05/2019 142 135 - 146 mmol/L Final   10/09/2017 137 134 - 144 mmol/L      Potassium   Date Value Ref Range Status   09/05/2019 4.4 3.5 - 5.3 mmol/L Final     Chloride   Date Value Ref Range Status   09/05/2019 105 98 - 110 mmol/L Final   10/09/2017 100 98 - 110 mmol/L      CO2   Date Value Ref Range Status   09/05/2019 25 20 - 32 mmol/L Final     Glucose   Date Value Ref Range Status   09/05/2019 69 65 - 139 mg/dL Final     Comment:               Non-fasting reference interval        10/09/2017 87 70 - 99 mg/dL      BUN, Bld   Date Value Ref Range Status   09/05/2019 9 7 - 25 mg/dL Final     Creatinine   Date Value Ref Range Status   09/05/2019 0.64 0.50 - 0.99 mg/dL Final     Comment:     For patients >49 years of age, the reference limit  for Creatinine is approximately 13% higher for people  identified as -American.        10/09/2017 0.63 0.60 - 1.40 mg/dL      Calcium   Date Value Ref Range Status   09/05/2019 9.6 8.6 - 10.4 mg/dL Final     Total Protein   Date Value Ref Range Status   09/05/2019 6.4 6.1 - 8.1 g/dL Final     Albumin   Date Value Ref Range Status   09/05/2019 4.1 3.6 - 5.1 g/dL Final   10/09/2017 4.3 3.1 - " 4.7 g/dL      Total Bilirubin   Date Value Ref Range Status   09/05/2019 0.5 0.2 - 1.2 mg/dL Final     Alkaline Phosphatase   Date Value Ref Range Status   09/05/2019 80 33 - 130 U/L Final     AST   Date Value Ref Range Status   09/05/2019 14 10 - 35 U/L Final     ALT   Date Value Ref Range Status   09/05/2019 9 6 - 29 U/L Final     eGFR if    Date Value Ref Range Status   09/05/2019 109 > OR = 60 mL/min/1.73m2 Final     eGFR if non    Date Value Ref Range Status   09/05/2019 94 > OR = 60 mL/min/1.73m2 Final     PET scan and lab ordered for now.    Radiology/Diagnostic Studies:    PET no metastatic dx, no NHL seen, SONYA 12/2018.    CAT 9/6/19 RML 8 mm nodule, due for repeat CAT 12/18/19.  PET scan 9/13/19 no increased uptake. At nodule.    U/S of leg no DVT 11/26/19.    Assessment:   (1) 64 y.o. female with diagnosis of  Bilateral breast cancer 2005, low grade NHL 2013.    No evidence for recurrent breast cancer or NHL. On last Pet 9/2019..    RML pulmonary nodule, re eval with CAT 12/18/19.  RTC 12/20/19.

## 2020-01-15 ENCOUNTER — TELEPHONE (OUTPATIENT)
Dept: HEMATOLOGY/ONCOLOGY | Facility: CLINIC | Age: 65
End: 2020-01-15

## 2020-01-15 DIAGNOSIS — R91.1 NODULE OF MIDDLE LOBE OF RIGHT LUNG: Primary | ICD-10-CM

## 2020-02-05 ENCOUNTER — OFFICE VISIT (OUTPATIENT)
Dept: SURGERY | Facility: CLINIC | Age: 65
End: 2020-02-05
Payer: MEDICARE

## 2020-02-05 VITALS
HEART RATE: 68 BPM | DIASTOLIC BLOOD PRESSURE: 84 MMHG | BODY MASS INDEX: 25.61 KG/M2 | SYSTOLIC BLOOD PRESSURE: 130 MMHG | TEMPERATURE: 98 F | WEIGHT: 140 LBS

## 2020-02-05 DIAGNOSIS — Z17.0 MALIGNANT NEOPLASM OF CENTRAL PORTION OF RIGHT BREAST IN FEMALE, ESTROGEN RECEPTOR POSITIVE: ICD-10-CM

## 2020-02-05 DIAGNOSIS — C50.111 MALIGNANT NEOPLASM OF CENTRAL PORTION OF RIGHT BREAST IN FEMALE, ESTROGEN RECEPTOR POSITIVE: ICD-10-CM

## 2020-02-05 DIAGNOSIS — C83.09 SMALL B-CELL LYMPHOMA OF EXTRANODAL SITE EXCLUDING SPLEEN AND OTHER SOLID ORGANS: ICD-10-CM

## 2020-02-05 DIAGNOSIS — Z95.828 PORT-A-CATH IN PLACE: Primary | ICD-10-CM

## 2020-02-05 PROCEDURE — 99214 OFFICE O/P EST MOD 30 MIN: CPT | Performed by: SURGERY

## 2020-02-05 PROCEDURE — 99203 PR OFFICE/OUTPT VISIT, NEW, LEVL III, 30-44 MIN: ICD-10-PCS | Mod: S$PBB,,, | Performed by: SURGERY

## 2020-02-05 PROCEDURE — 99203 OFFICE O/P NEW LOW 30 MIN: CPT | Mod: S$PBB,,, | Performed by: SURGERY

## 2020-02-05 NOTE — PROGRESS NOTES
"Subjective:       Patient ID: Fanta Berg is a 64 y.o. female.    Chief Complaint: Consult (Evaluation for possible port-a-cath removal)      HPI:  Patient is 64-year-old female referred to the office in consultation for Port-A-Cath removal.  She has history of breast cancer as well as lymphoma.  She currently has a Port-A-Cath in the right chest wall. It was placed at an outside facility.  She states that she gets it flushed every month but it has become extremely hard to flush it will not aspirate.  She has no other complaints today.    Past Medical History:   Diagnosis Date    Breast cancer     Breast cancer     bilat mast, chemo, arimidex     mda    Cancer     Cardiomyopathy due to chemotherapy     DDD (degenerative disc disease)     GERD (gastroesophageal reflux disease)     NHL (nodular histiocytic lymphoma)     Non Hodgkin's lymphoma     Non-Hodgkin lymphoma     Small cell B-cell lymphoma      Past Surgical History:   Procedure Laterality Date    BACK SURGERY      [facility]     [facility]     BREAST RECONSTRUCTION Bilateral     [facility]    BREAST SURGERY       SECTION      HYSTERECTOMY      port-a-cath placement Right 2015    SPINE SURGERY      TONSILLECTOMY      TOTAL ABDOMINAL HYSTERECTOMY W/ BILATERAL SALPINGOOPHORECTOMY       Review of patient's allergies indicates:   Allergen Reactions    Benadryl [diphenhydramine hcl] Anaphylaxis    Levaquin [levofloxacin] Other (See Comments)     PATIENT STATES THIS MEDICATION CAUSES HER JOINT PAIN AND WEAKNESS  n Tendinopathy  Tendon, Ligament damage from use after infection  Permanent tendon & ligament damage    Sulfamethoxazole-trimethoprim Nausea And Vomiting    Tolmetin Nausea Only    Codeine Palpitations and Rash     Breathing difficulty    Duloxetine hcl Other (See Comments)     "increased sweating"     Medication List with Changes/Refills   Current Medications    ACYCLOVIR 5% (ZOVIRAX) " 5 % OINTMENT    Apply topically 5 (five) times daily.    AZELASTINE (ASTELIN) 137 MCG (0.1 %) NASAL SPRAY    1 spray by Nasal route 2 (two) times daily.    MUPIROCIN (BACTROBAN) 2 % OINTMENT    Apply topically 3 (three) times daily as needed.     Family History   Problem Relation Age of Onset    Lung cancer Mother     Brain cancer Mother     Liver cancer Father     Heart failure Son     Heart disease Son      Social History     Socioeconomic History    Marital status: Single     Spouse name: Not on file    Number of children: Not on file    Years of education: Not on file    Highest education level: Not on file   Occupational History    Not on file   Social Needs    Financial resource strain: Not on file    Food insecurity:     Worry: Not on file     Inability: Not on file    Transportation needs:     Medical: Not on file     Non-medical: Not on file   Tobacco Use    Smoking status: Current Every Day Smoker     Packs/day: 1.00     Last attempt to quit: 2012     Years since quittin.1    Smokeless tobacco: Never Used   Substance and Sexual Activity    Alcohol use: No    Drug use: No    Sexual activity: Not Currently   Lifestyle    Physical activity:     Days per week: Not on file     Minutes per session: Not on file    Stress: Not on file   Relationships    Social connections:     Talks on phone: Not on file     Gets together: Not on file     Attends Jehovah's witness service: Not on file     Active member of club or organization: Not on file     Attends meetings of clubs or organizations: Not on file     Relationship status: Not on file   Other Topics Concern    Not on file   Social History Narrative    Not on file         Review of Systems    Objective:      Physical Exam   Constitutional: She is oriented to person, place, and time. She appears well-developed and well-nourished.  Non-toxic appearance. No distress.   HENT:   Head: Normocephalic and atraumatic. Head is without abrasion and  without laceration.   Right Ear: External ear normal.   Left Ear: External ear normal.   Nose: Nose normal.   Mouth/Throat: Oropharynx is clear and moist.   Eyes: Pupils are equal, round, and reactive to light. EOM are normal.   Neck: Trachea normal and phonation normal. Neck supple. No tracheal deviation and normal range of motion present.   Cardiovascular: Normal rate and regular rhythm.   Pulmonary/Chest: Effort normal. No accessory muscle usage. No tachypnea. No respiratory distress.   Port-A-Cath in right chest wall easily palpable       Abdominal: Soft. Normal appearance and bowel sounds are normal. She exhibits no distension. There is no tenderness. There is no rigidity, no rebound and no guarding.   Neurological: She is alert and oriented to person, place, and time.   Skin: Skin is warm and intact.   Psychiatric: She has a normal mood and affect. Her speech is normal and behavior is normal.       Assessment/Plan:   Port-A-Cath in place  -     Full code; Standing  -     Insert peripheral IV; Standing  -     Case Request Operating Room: REMOVAL, CATHETER, CENTRAL VENOUS, TUNNELED, WITH PORT    Malignant neoplasm of central portion of right breast in female, estrogen receptor positive    Small B-cell lymphoma of extranodal site excluding spleen and other solid organs    Other orders  -     Progressive Mobility Protocol (mobilize patient to their highest level of functioning at least twice daily); Standing     Patient is closely followed with Oncology.  Her Port-A-Cath has not been used for chemotherapy in quite some time and has been more difficult to flush.  Patient will be scheduled for Port-A-Cath removal this coming Tuesday.  All risks and benefits of the procedure were discussed with the patient.        I discussed the proposed procedures the the patient including risks, benefits, indications, alternatives and special concerns.  The patient appears to understand and agrees to go ahead with surgery.  I have  made no promises, warranties or verbal agreements beyond what was discussed above.

## 2020-02-05 NOTE — LETTER
February 5, 2020      PRIYANK Noonan MD  [address]  [address]  [place] [address]           [facility]  [address]  [place] [address]  Phone: [phone]  Fax: [phone]          Patient: Fanta Berg   MR Number: [id number]   YOB: 1955   Date of Visit: 2/5/2020       Dear Dr. PRIYANK Noonan:    Thank you for referring Fanta Berg to me for evaluation. Attached you will find relevant portions of my assessment and plan of care.    If you have questions, please do not hesitate to call me. I look forward to following Fanta Berg along with you.    Sincerely,    Jeff Amin III, MD    Enclosure  CC:  No Recipients    If you would like to receive this communication electronically, please contact [email or web address] or [phone] to request more information on [facility] access.    For providers and/or their staff who would like to refer a patient to [facility], please contact us through our one-stop-shop provider referral line, [facility], at [phone].    If you feel you have received this communication in error or would no longer like to receive these types of communications, please e-mail [email or web address]

## 2020-02-05 NOTE — H&P (VIEW-ONLY)
"Subjective:       Patient ID: Fanta Berg is a 64 y.o. female.    Chief Complaint: Consult (Evaluation for possible port-a-cath removal)      HPI:  Patient is 64-year-old female referred to the office in consultation for Port-A-Cath removal.  She has history of breast cancer as well as lymphoma.  She currently has a Port-A-Cath in the right chest wall. It was placed at an outside facility.  She states that she gets it flushed every month but it has become extremely hard to flush it will not aspirate.  She has no other complaints today.    Past Medical History:   Diagnosis Date    Breast cancer     Breast cancer     bilat mast, chemo, arimidex     mda    Cancer     Cardiomyopathy due to chemotherapy     DDD (degenerative disc disease)     GERD (gastroesophageal reflux disease)     NHL (nodular histiocytic lymphoma)     Non Hodgkin's lymphoma     Non-Hodgkin lymphoma     Small cell B-cell lymphoma      Past Surgical History:   Procedure Laterality Date    BACK SURGERY      [facility]     BREAST RECONSTRUCTION Bilateral     [facility]    BREAST SURGERY       SECTION      HYSTERECTOMY      port-a-cath placement Right 2015    SPINE SURGERY      TONSILLECTOMY      TOTAL ABDOMINAL HYSTERECTOMY W/ BILATERAL SALPINGOOPHORECTOMY       Review of patient's allergies indicates:   Allergen Reactions    Benadryl [diphenhydramine hcl] Anaphylaxis    Levaquin [levofloxacin] Other (See Comments)     PATIENT STATES THIS MEDICATION CAUSES HER JOINT PAIN AND WEAKNESS  n Tendinopathy  Tendon, Ligament damage from use after infection  Permanent tendon & ligament damage    Sulfamethoxazole-trimethoprim Nausea And Vomiting    Tolmetin Nausea Only    Codeine Palpitations and Rash     Breathing difficulty    Duloxetine hcl Other (See Comments)     "increased sweating"     Medication List with Changes/Refills   Current Medications    ACYCLOVIR 5% (ZOVIRAX) " 5 % OINTMENT    Apply topically 5 (five) times daily.    AZELASTINE (ASTELIN) 137 MCG (0.1 %) NASAL SPRAY    1 spray by Nasal route 2 (two) times daily.    MUPIROCIN (BACTROBAN) 2 % OINTMENT    Apply topically 3 (three) times daily as needed.     Family History   Problem Relation Age of Onset    Lung cancer Mother     Brain cancer Mother     Liver cancer Father     Heart failure Son     Heart disease Son      Social History     Socioeconomic History    Marital status: Single     Spouse name: Not on file    Number of children: Not on file    Years of education: Not on file    Highest education level: Not on file   Occupational History    Not on file   Social Needs    Financial resource strain: Not on file    Food insecurity:     Worry: Not on file     Inability: Not on file    Transportation needs:     Medical: Not on file     Non-medical: Not on file   Tobacco Use    Smoking status: Current Every Day Smoker     Packs/day: 1.00     Last attempt to quit: 2012     Years since quittin.1    Smokeless tobacco: Never Used   Substance and Sexual Activity    Alcohol use: No    Drug use: No    Sexual activity: Not Currently   Lifestyle    Physical activity:     Days per week: Not on file     Minutes per session: Not on file    Stress: Not on file   Relationships    Social connections:     Talks on phone: Not on file     Gets together: Not on file     Attends Taoist service: Not on file     Active member of club or organization: Not on file     Attends meetings of clubs or organizations: Not on file     Relationship status: Not on file   Other Topics Concern    Not on file   Social History Narrative    Not on file         Review of Systems    Objective:      Physical Exam   Constitutional: She is oriented to person, place, and time. She appears well-developed and well-nourished.  Non-toxic appearance. No distress.   HENT:   Head: Normocephalic and atraumatic. Head is without abrasion and  without laceration.   Right Ear: External ear normal.   Left Ear: External ear normal.   Nose: Nose normal.   Mouth/Throat: Oropharynx is clear and moist.   Eyes: Pupils are equal, round, and reactive to light. EOM are normal.   Neck: Trachea normal and phonation normal. Neck supple. No tracheal deviation and normal range of motion present.   Cardiovascular: Normal rate and regular rhythm.   Pulmonary/Chest: Effort normal. No accessory muscle usage. No tachypnea. No respiratory distress.   Port-A-Cath in right chest wall easily palpable       Abdominal: Soft. Normal appearance and bowel sounds are normal. She exhibits no distension. There is no tenderness. There is no rigidity, no rebound and no guarding.   Neurological: She is alert and oriented to person, place, and time.   Skin: Skin is warm and intact.   Psychiatric: She has a normal mood and affect. Her speech is normal and behavior is normal.       Assessment/Plan:   Port-A-Cath in place  -     Full code; Standing  -     Insert peripheral IV; Standing  -     Case Request Operating Room: REMOVAL, CATHETER, CENTRAL VENOUS, TUNNELED, WITH PORT    Malignant neoplasm of central portion of right breast in female, estrogen receptor positive    Small B-cell lymphoma of extranodal site excluding spleen and other solid organs    Other orders  -     Progressive Mobility Protocol (mobilize patient to their highest level of functioning at least twice daily); Standing     Patient is closely followed with Oncology.  Her Port-A-Cath has not been used for chemotherapy in quite some time and has been more difficult to flush.  Patient will be scheduled for Port-A-Cath removal this coming Tuesday.  All risks and benefits of the procedure were discussed with the patient.        I discussed the proposed procedures the the patient including risks, benefits, indications, alternatives and special concerns.  The patient appears to understand and agrees to go ahead with surgery.  I have  made no promises, warranties or verbal agreements beyond what was discussed above.

## 2020-02-06 ENCOUNTER — HOSPITAL ENCOUNTER (OUTPATIENT)
Dept: RADIOLOGY | Facility: HOSPITAL | Age: 65
Discharge: HOME | End: 2020-02-06
Attending: SURGERY
Payer: MEDICARE

## 2020-02-06 ENCOUNTER — HOSPITAL ENCOUNTER (OUTPATIENT)
Dept: PREADMISSION TESTING | Facility: HOSPITAL | Age: 65
Discharge: HOME | End: 2020-02-06
Attending: SURGERY
Payer: MEDICARE

## 2020-02-06 VITALS
WEIGHT: 141.13 LBS | BODY MASS INDEX: 25.97 KG/M2 | RESPIRATION RATE: 18 BRPM | HEIGHT: 62 IN | SYSTOLIC BLOOD PRESSURE: 138 MMHG | OXYGEN SATURATION: 99 % | HEART RATE: 70 BPM | TEMPERATURE: 98 F | DIASTOLIC BLOOD PRESSURE: 88 MMHG

## 2020-02-06 DIAGNOSIS — Z01.818 PRE-OP TESTING: ICD-10-CM

## 2020-02-06 DIAGNOSIS — Z01.818 PRE-OP TESTING: Primary | ICD-10-CM

## 2020-02-06 PROCEDURE — 93005 ELECTROCARDIOGRAM TRACING: CPT

## 2020-02-06 PROCEDURE — 71046 X-RAY EXAM CHEST 2 VIEWS: CPT | Mod: TC

## 2020-02-06 RX ORDER — FLUTICASONE PROPIONATE 50 MCG
1 SPRAY, SUSPENSION (ML) NASAL DAILY
COMMUNITY
End: 2020-03-09 | Stop reason: SDUPTHER

## 2020-02-06 NOTE — DISCHARGE INSTRUCTIONS
To confirm, Your doctor has instructed you that surgery is scheduled for: February 11    Pre-Op will call the afternoon prior to surgery between 4:00 and 6:00 PM with the final arrival time.      Please report to [facility] on [address] the morning of surgery.     Do not eat or drink anything after midnight the night before your surgery - THIS INCLUDES  WATER, GUM, MINTS AND CANDY.  YOU MAY BRUSH YOUR TEETH BUT DO NOT SWALLOW     TAKE ONLY THESE MEDICATIONS WITH A SMALL SIP OF WATER THE MORNING OF YOUR PROCEDURE:  NONE      PLEASE NOTE:  The surgery schedule has many variables which may affect the time of your surgery case.  Family members should be available if your surgery time changes.  Plan to be here the day of your procedure between 4-6 hours.    DO NOT TAKE THESE MEDICATIONS 5-7 DAYS PRIOR to your procedure or per your surgeon's request: ASPIRIN, ALEVE, ADVIL, IBUPROFEN,  ELLY SELTZER, BC , FISH OIL , VITAMIN E, HERBALS  (May take Tylenol)                                                       IMPORTANT INSTRUCTIONS    · Do not smoke, vape or drink alcoholic beverages 24 hours prior to your procedure.  · Shower the night before AND the morning of your procedure with a Chlorhexidine wash such as Hibiclens or Dial antibacterial soap from the neck down.   ·  Do not get it on your face or in your eyes.  You may use your own shampoo and face wash. This helps your skin to be as bacteria free as possible.   ·  DO NOT remove hair from the surgery site.  Do not shave the incision site unless you are given specific instructions to do so.    · Sleep in a bed with clean sheets.  Do not sleep with a pet in the bed.   · If you wear contact lenses, dentures, hearing aids or glasses, bring a container to put them in during surgery and give to a family member for safe keeping.    · Please leave all jewelry, piercing's and valuables at home.   · If your doctor has scheduled you for an overnight stay, bring a small  overnight bag with any personal items you need.    · Make arrangements in advance for transportation home by a responsible adult.      · You must make arrangements for transportation, TAXI'S, UBER'S OR LYFTS ARE NOT ALLOWED.        If you have any questions about these instructions, call (Monday - Friday) Pre-Op Admit  Nursing  at [phone] or the Pre-Op Day Surgery Unit at [phone].

## 2020-02-11 ENCOUNTER — ANESTHESIA EVENT (OUTPATIENT)
Dept: SURGERY | Facility: HOSPITAL | Age: 65
End: 2020-02-11
Payer: MEDICARE

## 2020-02-11 ENCOUNTER — HOSPITAL ENCOUNTER (OUTPATIENT)
Facility: HOSPITAL | Age: 65
Discharge: HOME | End: 2020-02-11
Attending: SURGERY | Admitting: SURGERY
Payer: MEDICARE

## 2020-02-11 ENCOUNTER — ANESTHESIA (OUTPATIENT)
Dept: SURGERY | Facility: HOSPITAL | Age: 65
End: 2020-02-11
Payer: MEDICARE

## 2020-02-11 VITALS
BODY MASS INDEX: 25.97 KG/M2 | TEMPERATURE: 98 F | HEART RATE: 63 BPM | WEIGHT: 141.13 LBS | SYSTOLIC BLOOD PRESSURE: 149 MMHG | OXYGEN SATURATION: 99 % | DIASTOLIC BLOOD PRESSURE: 70 MMHG | RESPIRATION RATE: 12 BRPM | HEIGHT: 62 IN

## 2020-02-11 DIAGNOSIS — Z95.828 PORT-A-CATH IN PLACE: Primary | ICD-10-CM

## 2020-02-11 PROCEDURE — 71000033 HC RECOVERY, INTIAL HOUR: Performed by: SURGERY

## 2020-02-11 PROCEDURE — 27000080 OPTIME MED/SURG SUP & DEVICES GENERAL CLASSIFICATION: Performed by: SURGERY

## 2020-02-11 PROCEDURE — 25000003 PHARM REV CODE 250: Performed by: SURGERY

## 2020-02-11 PROCEDURE — 63600175 PHARM REV CODE 636 W HCPCS: Performed by: SURGERY

## 2020-02-11 PROCEDURE — 36590 REMOVAL TUNNELED CV CATH: CPT | Mod: ,,, | Performed by: SURGERY

## 2020-02-11 PROCEDURE — 27200651 HC AIRWAY, LMA: Performed by: ANESTHESIOLOGY

## 2020-02-11 PROCEDURE — 25000003 PHARM REV CODE 250: Performed by: NURSE ANESTHETIST, CERTIFIED REGISTERED

## 2020-02-11 PROCEDURE — 36590 PR REMOVAL TUNNELED CV CATH W SUBQ PORT OR PUMP: ICD-10-PCS | Mod: ,,, | Performed by: SURGERY

## 2020-02-11 PROCEDURE — 63600175 PHARM REV CODE 636 W HCPCS: Performed by: NURSE ANESTHETIST, CERTIFIED REGISTERED

## 2020-02-11 PROCEDURE — 27000673 HC TUBING BLOOD Y: Performed by: ANESTHESIOLOGY

## 2020-02-11 PROCEDURE — 37000009 HC ANESTHESIA EA ADD 15 MINS: Performed by: SURGERY

## 2020-02-11 PROCEDURE — 36000707: Performed by: SURGERY

## 2020-02-11 PROCEDURE — 37000008 HC ANESTHESIA 1ST 15 MINUTES: Performed by: SURGERY

## 2020-02-11 PROCEDURE — 36000706: Performed by: SURGERY

## 2020-02-11 PROCEDURE — 71000015 HC POSTOP RECOV 1ST HR: Performed by: SURGERY

## 2020-02-11 PROCEDURE — 27202107 HC XP QUATRO SENSOR: Performed by: ANESTHESIOLOGY

## 2020-02-11 PROCEDURE — 27000671 HC TUBING MICROBORE EXT: Performed by: ANESTHESIOLOGY

## 2020-02-11 RX ORDER — ONDANSETRON 2 MG/ML
INJECTION INTRAMUSCULAR; INTRAVENOUS
Status: DISCONTINUED | OUTPATIENT
Start: 2020-02-11 | End: 2020-02-11

## 2020-02-11 RX ORDER — FENTANYL CITRATE 50 UG/ML
INJECTION, SOLUTION INTRAMUSCULAR; INTRAVENOUS
Status: DISCONTINUED | OUTPATIENT
Start: 2020-02-11 | End: 2020-02-11

## 2020-02-11 RX ORDER — LIDOCAINE HYDROCHLORIDE 20 MG/ML
JELLY TOPICAL
Status: DISCONTINUED | OUTPATIENT
Start: 2020-02-11 | End: 2020-02-11

## 2020-02-11 RX ORDER — OXYCODONE HYDROCHLORIDE 5 MG/1
5 TABLET ORAL
Status: DISCONTINUED | OUTPATIENT
Start: 2020-02-11 | End: 2020-02-11 | Stop reason: HOSPADM

## 2020-02-11 RX ORDER — PROPOFOL 10 MG/ML
VIAL (ML) INTRAVENOUS
Status: DISCONTINUED | OUTPATIENT
Start: 2020-02-11 | End: 2020-02-11

## 2020-02-11 RX ORDER — BUPIVACAINE HCL/EPINEPHRINE 0.25-.0005
VIAL (ML) INJECTION
Status: DISCONTINUED | OUTPATIENT
Start: 2020-02-11 | End: 2020-02-11 | Stop reason: HOSPADM

## 2020-02-11 RX ORDER — CEFAZOLIN SODIUM 2 G/50ML
2 SOLUTION INTRAVENOUS
Status: COMPLETED | OUTPATIENT
Start: 2020-02-11 | End: 2020-02-11

## 2020-02-11 RX ORDER — SODIUM CHLORIDE 0.9 % (FLUSH) 0.9 %
10 SYRINGE (ML) INJECTION
Status: DISCONTINUED | OUTPATIENT
Start: 2020-02-11 | End: 2020-02-11 | Stop reason: HOSPADM

## 2020-02-11 RX ORDER — ONDANSETRON 2 MG/ML
4 INJECTION INTRAMUSCULAR; INTRAVENOUS DAILY PRN
Status: DISCONTINUED | OUTPATIENT
Start: 2020-02-11 | End: 2020-02-11 | Stop reason: HOSPADM

## 2020-02-11 RX ORDER — SODIUM CHLORIDE, SODIUM LACTATE, POTASSIUM CHLORIDE, CALCIUM CHLORIDE 600; 310; 30; 20 MG/100ML; MG/100ML; MG/100ML; MG/100ML
INJECTION, SOLUTION INTRAVENOUS CONTINUOUS PRN
Status: DISCONTINUED | OUTPATIENT
Start: 2020-02-11 | End: 2020-02-11

## 2020-02-11 RX ORDER — MEPERIDINE HYDROCHLORIDE 50 MG/ML
12.5 INJECTION INTRAMUSCULAR; INTRAVENOUS; SUBCUTANEOUS EVERY 10 MIN PRN
Status: DISCONTINUED | OUTPATIENT
Start: 2020-02-11 | End: 2020-02-11 | Stop reason: HOSPADM

## 2020-02-11 RX ORDER — MIDAZOLAM HYDROCHLORIDE 1 MG/ML
INJECTION INTRAMUSCULAR; INTRAVENOUS
Status: DISCONTINUED | OUTPATIENT
Start: 2020-02-11 | End: 2020-02-11

## 2020-02-11 RX ORDER — HYDROMORPHONE HYDROCHLORIDE 1 MG/ML
0.2 INJECTION, SOLUTION INTRAMUSCULAR; INTRAVENOUS; SUBCUTANEOUS
Status: DISCONTINUED | OUTPATIENT
Start: 2020-02-11 | End: 2020-02-11 | Stop reason: HOSPADM

## 2020-02-11 RX ORDER — LIDOCAINE HYDROCHLORIDE 20 MG/ML
INJECTION, SOLUTION EPIDURAL; INFILTRATION; INTRACAUDAL; PERINEURAL
Status: DISCONTINUED | OUTPATIENT
Start: 2020-02-11 | End: 2020-02-11

## 2020-02-11 RX ADMIN — CEFAZOLIN SODIUM 2 G: 2 SOLUTION INTRAVENOUS at 07:02

## 2020-02-11 RX ADMIN — FENTANYL CITRATE 50 MCG: 50 INJECTION INTRAMUSCULAR; INTRAVENOUS at 07:02

## 2020-02-11 RX ADMIN — LIDOCAINE HYDROCHLORIDE 80 MG: 20 INJECTION, SOLUTION EPIDURAL; INFILTRATION; INTRACAUDAL; PERINEURAL at 07:02

## 2020-02-11 RX ADMIN — ONDANSETRON 4 MG: 2 INJECTION INTRAMUSCULAR; INTRAVENOUS at 07:02

## 2020-02-11 RX ADMIN — LIDOCAINE HYDROCHLORIDE 5 ML: 20 JELLY TOPICAL at 07:02

## 2020-02-11 RX ADMIN — PROPOFOL 60 MG: 10 INJECTION, EMULSION INTRAVENOUS at 07:02

## 2020-02-11 RX ADMIN — PROPOFOL 140 MG: 10 INJECTION, EMULSION INTRAVENOUS at 07:02

## 2020-02-11 RX ADMIN — MIDAZOLAM HYDROCHLORIDE 2 MG: 1 INJECTION, SOLUTION INTRAMUSCULAR; INTRAVENOUS at 07:02

## 2020-02-11 RX ADMIN — SODIUM CHLORIDE, SODIUM LACTATE, POTASSIUM CHLORIDE, AND CALCIUM CHLORIDE: .6; .31; .03; .02 INJECTION, SOLUTION INTRAVENOUS at 07:02

## 2020-02-11 NOTE — ANESTHESIA PREPROCEDURE EVALUATION
2020  Fanta Berg is a 64 y.o., female.    Patient Active Problem List   Diagnosis    Breast cancer    DDD (degenerative disc disease)    Chronic pain    Small cell B-cell lymphoma    Back pain    DDD (degenerative disc disease), lumbosacral    Neural foraminal stenosis of lumbar spine    Encounter for smoking cessation counseling    History of breast cancer    History of non-Hodgkin's lymphoma    Hyperlipidemia    Port-A-Cath in place       Past Surgical History:   Procedure Laterality Date    BACK SURGERY      BREAST RECONSTRUCTION Bilateral     MD Lyman     BREAST RECONSTRUCTION Bilateral     MD Lyman    BREAST SURGERY       SECTION      HYSTERECTOMY      port-a-cath placement Right 2015    SPINE SURGERY      TONSILLECTOMY      TOTAL ABDOMINAL HYSTERECTOMY W/ BILATERAL SALPINGOOPHORECTOMY          Tobacco Use:  The patient  reports that she has been smoking. She started smoking about 12 years ago. She has been smoking about 1.00 pack per day. She has never used smokeless tobacco.     Results for orders placed or performed during the hospital encounter of 20   EKG 12-lead    Collection Time: 20  3:46 PM    Narrative    Test Reason : Z01.818,    Vent. Rate : 060 BPM     Atrial Rate : 060 BPM     P-R Int : 146 ms          QRS Dur : 080 ms      QT Int : 428 ms       P-R-T Axes : 057 -10 047 degrees     QTc Int : 428 ms    Normal sinus rhythm  Inferior infarct ,age undetermined  Abnormal ECG  No previous ECGs available  Confirmed by Marley Alexander MD ([id number]) on 2020 9:12:07 PM    Referred By:  CARIE           Confirmed By:Marley Alexander MD             Lab Results   Component Value Date    WBC 7.56 2020    WBC 7.56 2020    HGB 15.4 2020    HGB 15.4 2020    HCT 46.0 2020    HCT 46.0 2020    MCV 92  02/06/2020    MCV 92 02/06/2020     02/06/2020     02/06/2020     BMP  Lab Results   Component Value Date     02/06/2020     02/06/2020    K 4.8 02/06/2020    K 4.8 02/06/2020     02/06/2020     02/06/2020    CO2 27 02/06/2020    CO2 27 02/06/2020    BUN 9 02/06/2020    BUN 9 02/06/2020    CREATININE 0.7 02/06/2020    CREATININE 0.7 02/06/2020    CALCIUM 9.6 02/06/2020    CALCIUM 9.6 02/06/2020    ANIONGAP 11 02/06/2020    ANIONGAP 11 02/06/2020    ESTGFRAFRICA >60.0 02/06/2020    ESTGFRAFRICA >60.0 02/06/2020    EGFRNONAA >60.0 02/06/2020    EGFRNONAA >60.0 02/06/2020             Anesthesia Evaluation    I have reviewed the Patient Summary Reports.     I have reviewed the Medications.     Review of Systems  Anesthesia Hx:  No problems with previous Anesthesia Denies Hx of Anesthetic complications  Denies Family Hx of Anesthesia complications.   Denies Personal Hx of Anesthesia complications.   Hematology/Oncology:  Hematology Normal   Oncology Normal     EENT/Dental:EENT/Dental Normal   Cardiovascular:  Cardiovascular Normal     Pulmonary:  Pulmonary Normal    Renal/:  Renal/ Normal     Hepatic/GI:   GERD    Musculoskeletal:  Spine Disorders: lumbar Degenerative disease    Neurological:  Neurology Normal    Endocrine:  Endocrine Normal    Psych:  Psychiatric Normal           Physical Exam  General:  Well nourished    Airway/Jaw/Neck:  Airway Findings: Mouth Opening: Normal Tongue: Normal  General Airway Assessment: Adult  Mallampati: I  TM Distance: Normal, at least 6 cm  Jaw/Neck Findings:  Neck ROM: Normal ROM      Dental:  Dental Findings: Edentulous   Chest/Lungs:  Chest/Lungs Findings: Clear to auscultation, Normal Respiratory Rate     Heart/Vascular:  Heart Findings: Rate: Normal  Rhythm: Regular Rhythm  Sounds: Normal        Mental Status:  Mental Status Findings:  Alert and Oriented         Anesthesia Plan  Type of Anesthesia, risks & benefits discussed:  Anesthesia  Type:  general  Patient's Preference: general  Intra-op Monitoring Plan: standard ASA monitors  Intra-op Monitoring Plan Comments:   Post Op Pain Control Plan:   Post Op Pain Control Plan Comments:   Induction:   IV  Beta Blocker:         Informed Consent: Patient understands risks and agrees with Anesthesia plan.  Questions answered. Anesthesia consent signed with patient.  ASA Score: 2     Day of Surgery Review of History & Physical:        Anesthesia Plan Notes: Pt does not want local only 2/2 bad past experience.  LMA         Ready For Surgery From Anesthesia Perspective.

## 2020-02-11 NOTE — OP NOTE
Surgery Date: 2/11/2020     Surgeon(s) and Role:     * Jeff Amin III, MD - Primary    Assisting Surgeon: None    Pre-op Diagnosis:  Port-A-Cath in place [Z95.828], history of breast cancer and lymphoma    Post-op Diagnosis:  Post-Op Diagnosis Codes:     * Port-A-Cath in place [Z95.828], history of breast cancer and lymphoma    Procedure(s) (LRB):  REMOVAL, CATHETER PORT (Right)    Anesthesia: General    Description of Procedure: The patient was taken to operating room and transferred to the operating room table in the supine position.  She was given general anesthesia and LMA was placed.  The port site in the right chest wall was prepped and draped.  Local anesthetic was infiltrated along the previous incision.  An incision was made into the previous scar.  Dissection was carried down to the port which was superficial in the subcutaneous tissue.  The fibrous capsule around the port was entered and the Prolene sutures were cut and removed.  The port and catheter were then removed from the chest wall and vein without trouble.  Pressure was applied.   The incision was then closed in two layers, first the deep dermis was approximated with interrupted Vicryl and the skin was closed with 4-0 Monocryl. LMA was removed. She tolerated the procedure well and was brought back to the recovery room in stable condition.      Description of the findings of the procedure:  Port and catheter completely removed    Estimated Blood Loss:5mL    Complications none    Instrument counts correct         Specimens:   Specimen (12h ago, onward)    None

## 2020-02-11 NOTE — BRIEF OP NOTE
[facility]  Brief Operative Note    SUMMARY     Surgery Date: 2/11/2020     Surgeon(s) and Role:     * Jeff Amin III, MD - Primary    Assisting Surgeon: None    Pre-op Diagnosis:  Port-A-Cath in place [Z95.828], history of breast cancer and lymphoma    Post-op Diagnosis:  Post-Op Diagnosis Codes:     * Port-A-Cath in place [Z95.828], history of breast cancer and lymphoma    Procedure(s) (LRB):  REMOVAL, CATHETER PORT (Right)    Anesthesia: General    Description of Procedure: The patient was taken to operating room and transferred to the operating room table in the supine position.  She was given general anesthesia and LMA was placed.  The port site in the right chest wall was prepped and draped.  Local anesthetic was infiltrated along the previous incision.  An incision was made into the previous scar.  Dissection was carried down to the port which was superficial in the subcutaneous tissue.  The fibrous capsule around the port was entered and the Prolene sutures were cut and removed.  The port and catheter were then removed from the chest wall and vein without trouble.  Pressure was applied.   The incision was then closed in two layers, first the deep dermis was approximated with interrupted Vicryl and the skin was closed with 4-0 Monocryl. LMA was removed. She tolerated the procedure well and was brought back to the recovery room in stable condition.      Description of the findings of the procedure:  Port and catheter completely removed    Estimated Blood Loss:5mL    Complications none    Instrument counts correct         Specimens:   Specimen (12h ago, onward)    None

## 2020-02-11 NOTE — ANESTHESIA POSTPROCEDURE EVALUATION
Anesthesia Post Evaluation    Patient: Fanta Berg    Procedure(s) Performed: Procedure(s) (LRB):  REMOVAL, CATHETER PORT (Right)    Final Anesthesia Type: general    Patient location during evaluation: PACU  Patient participation: Yes- Able to Participate  Level of consciousness: awake and alert  Post-procedure vital signs: reviewed and stable  Pain management: adequate  Airway patency: patent  SALONI mitigation strategies: Multimodal analgesia, Extubation while patient is awake, Verification of full reversal of neuromuscular block and Extubation and recovery carried out in lateral, semiupright, or other nonsupine position  PONV status at discharge: No PONV  Anesthetic complications: no      Cardiovascular status: hemodynamically stable  Respiratory status: unassisted, spontaneous ventilation and room air  Hydration status: euvolemic  Follow-up not needed.          Vitals Value Taken Time   /66 2/11/2020  9:00 AM   Temp 36.3 °C (97.4 °F) 2/11/2020  5:55 AM   Pulse 47 2/11/2020  9:03 AM   Resp 14 2/11/2020  9:03 AM   SpO2 99 % 2/11/2020  9:03 AM   Vitals shown include unvalidated device data.      No case tracking events are documented in the log.      Pain/Kai Score: No data recorded

## 2020-02-11 NOTE — DISCHARGE INSTRUCTIONS
REMOVE DRESSING IN 48 HOURS  MAY SHOWER IN 48 HOURS  NO TUB BATH OR SWIMMING UNTIL WOUND COMPLETELY HEALED  Discharge Instructions: After Your Surgery    Youve just had surgery. During surgery, you were given medicine called anesthesia to keep you relaxed and free of pain. After surgery, you may have some pain or nausea. This is common. Here are some tips for feeling better and getting well after surgery.     Stay on schedule with your medicine.   Going home  Your healthcare provider will show you how to take care of yourself when you go home. He or she will also answer your questions. Have an adult family member or friend drive you home. For the first 24 hours after your surgery:  · Do not drive or use heavy equipment.  · Do not make important decisions or sign legal papers.  · Do not drink alcohol.  · Have someone stay with you, if needed. He or she can watch for problems and help keep you safe.  Be sure to go to all follow-up visits with your healthcare provider. And rest after your surgery for as long as your healthcare provider tells you to.  Coping with pain  If you have pain after surgery, pain medicine will help you feel better. Take it as told, before pain becomes severe. Also, ask your healthcare provider or pharmacist about other ways to control pain. This might be with heat, ice, or relaxation. And follow any other instructions your surgeon or nurse gives you.  Tips for taking pain medicine  To get the best relief possible, remember these points:  · Pain medicines can upset your stomach. Taking them with a little food may help.  · Most pain relievers taken by mouth need at least 20 to 30 minutes to start to work.  · Taking medicine on a schedule can help you remember to take it. Try to time your medicine so that you can take it before starting an activity. This might be before you get dressed, go for a walk, or sit down for dinner.  · Constipation is a common side effect of pain medicines. Call your  healthcare provider before taking any medicines such as laxatives or stool softeners to help ease constipation. Also ask if you should skip any foods. Drinking lots of fluids and eating foods such as fruits and vegetables that are high in fiber can also help. Remember, do not take laxatives unless your surgeon has prescribed them.  · Drinking alcohol and taking pain medicine can cause dizziness and slow your breathing. It can even be deadly. Do not drink alcohol while taking pain medicine.  · Pain medicine can make you react more slowly to things. Do not drive or run machinery while taking pain medicine.  Your healthcare provider may tell you to take acetaminophen to help ease your pain. Ask him or her how much you are supposed to take each day. Acetaminophen or other pain relievers may interact with your prescription medicines or other over-the-counter (OTC) medicines. Some prescription medicines have acetaminophen and other ingredients. Using both prescription and OTC acetaminophen for pain can cause you to overdose. Read the labels on your OTC medicines with care. This will help you to clearly know the list of ingredients, how much to take, and any warnings. It may also help you not take too much acetaminophen. If you have questions or do not understand the information, ask your pharmacist or healthcare provider to explain it to you before you take the OTC medicine.  Managing nausea  Some people have an upset stomach after surgery. This is often because of anesthesia, pain, or pain medicine, or the stress of surgery. These tips will help you handle nausea and eat healthy foods as you get better. If you were on a special food plan before surgery, ask your healthcare provider if you should follow it while you get better. These tips may help:  · Do not push yourself to eat. Your body will tell you when to eat and how much.  · Start off with clear liquids and soup. They are easier to digest.  · Next try semi-solid  foods, such as mashed potatoes, applesauce, and gelatin, as you feel ready.  · Slowly move to solid foods. Dont eat fatty, rich, or spicy foods at first.  · Do not force yourself to have 3 large meals a day. Instead eat smaller amounts more often.  · Take pain medicines with a small amount of solid food, such as crackers or toast, to avoid nausea.     Call your surgeon if  · You still have pain an hour after taking medicine. The medicine may not be strong enough.  · You feel too sleepy, dizzy, or groggy. The medicine may be too strong.  · You have side effects like nausea, vomiting, or skin changes, such as rash, itching, or hives.       If you have obstructive sleep apnea  You were given anesthesia medicine during surgery to keep you comfortable and free of pain. After surgery, you may have more apnea spells because of this medicine and other medicines you were given. The spells may last longer than usual.   At home:  · Keep using the continuous positive airway pressure (CPAP) device when you sleep. Unless your healthcare provider tells you not to, use it when you sleep, day or night. CPAP is a common device used to treat obstructive sleep apnea.  · Talk with your provider before taking any pain medicine, muscle relaxants, or sedatives. Your provider will tell you about the possible dangers of taking these medicines.  Date Last Reviewed: 12/1/2016  © 3810-0599 [employer]. [address] [place] [address], [place] [address]. All rights reserved. This information is not intended as a substitute for professional medical care. Always follow your healthcare professional's instructions.

## 2020-02-11 NOTE — INTERVAL H&P NOTE
The patient has been examined and the H&P has been reviewed:    I concur with the findings and no changes have occurred since H&P was written.    Anesthesia/Surgery risks, benefits and alternative options discussed and understood by patient/family.          Active Hospital Problems    Diagnosis  POA    Port-A-Cath in place [Z95.828]  Not Applicable      Resolved Hospital Problems   No resolved problems to display.

## 2020-02-11 NOTE — PLAN OF CARE
Transported to Room 1552 in [facility] in stable condition.  Pt has glasses on and dentures in.  Denies pain.  Dressing to right upper chest D&I  Report to SAVANNA Wilcox

## 2020-02-11 NOTE — DISCHARGE SUMMARY
Discharge Summary  General Surgery      Admit Date: 2/11/2020    Discharge Date :2/11/2020    Attending Physician: Jeff Amin III     Discharge Physician: Jeff Amin III    Discharged Condition: good    Discharge Diagnosis: Port-A-Cath in place [Z95.828]    Treatments/Procedures: Procedure(s) (LRB):  REMOVAL, CATHETER PORT (Right)    Hospital Course: Uneventful; Discharged home from Recovery    Significant Diagnostic Studies: none    Disposition: Home or Self Care    Diet: Regular    Follow up: Office 10-14 days    Activity: ad ja.    Patient Instructions:   Current Discharge Medication List      CONTINUE these medications which have NOT CHANGED    Details   fluticasone propionate (FLONASE) 50 mcg/actuation nasal spray 1 spray by Each Nostril route once daily.      acyclovir 5% (ZOVIRAX) 5 % ointment Apply topically 5 (five) times daily.  Qty: 30 g, Refills: 2    Associated Diagnoses: Fever blister      mupirocin (BACTROBAN) 2 % ointment Apply topically 3 (three) times daily as needed.  Qty: 22 g, Refills: 2             Discharge Procedure Orders   Diet Adult Regular     Notify your health care provider if you experience any of the following:  temperature >100.4     Notify your health care provider if you experience any of the following:  persistent nausea and vomiting or diarrhea     Notify your health care provider if you experience any of the following:  redness, tenderness, or signs of infection (pain, swelling, redness, odor or green/yellow discharge around incision site)     Notify your health care provider if you experience any of the following:  increased confusion or weakness     Remove dressing in 48 hours     Shower on day dressing removed (No bath)

## 2020-02-11 NOTE — TRANSFER OF CARE
"Anesthesia Transfer of Care Note    Patient: Fanta Berg    Procedure(s) Performed: Procedure(s) (LRB):  REMOVAL, CATHETER PORT (Right)    Patient location: PACU    Anesthesia Type: general    Transport from OR: Transported from OR on room air with adequate spontaneous ventilation    Post pain: adequate analgesia    Post assessment: no apparent anesthetic complications    Post vital signs: stable    Level of consciousness: awake and alert    Nausea/Vomiting: no nausea/vomiting    Complications: none    Transfer of care protocol was followed      Last vitals:   Visit Vitals  /75 (BP Location: Left arm, Patient Position: Lying)   Pulse 61   Temp 36.3 °C (97.4 °F) (Oral)   Resp 16   Ht 5' 2" (1.575 m)   Wt 64 kg (141 lb 1.5 oz)   SpO2 100%   Breastfeeding? No   BMI 25.81 kg/m²     "

## 2020-02-11 NOTE — ANESTHESIA PROCEDURE NOTES
Intubation  Performed by: Venkata Guerin CRNA  Authorized by: Luis Lima MD     Intubation:     Induction:  Intravenous    Intubated:  Postinduction    Mask Ventilation:  N/a    Attempts:  1    Attempted By:  CRNA    Method of Intubation:  Blind intubation    Difficult Airway Encountered?: No      Complications:  None    Airway Device:  Supraglottic airway/LMA    Airway Device Size:  4.0    Style/Cuff Inflation:  Uncuffed    Secured at:  The lips    Placement Verified By:  Capnometry    Complicating Factors:  None    Findings Post-Intubation:  BS equal bilateral

## 2020-02-11 NOTE — PLAN OF CARE
Arrived to PACU s/p port a cath removal.  Has dressing to right upper chest wall with mepilex that is D&I.  Denies pain

## 2020-02-26 ENCOUNTER — OFFICE VISIT (OUTPATIENT)
Dept: SURGERY | Facility: CLINIC | Age: 65
End: 2020-02-26
Payer: MEDICARE

## 2020-02-26 VITALS
HEIGHT: 62 IN | HEART RATE: 67 BPM | WEIGHT: 141 LBS | DIASTOLIC BLOOD PRESSURE: 86 MMHG | TEMPERATURE: 98 F | SYSTOLIC BLOOD PRESSURE: 143 MMHG | BODY MASS INDEX: 25.95 KG/M2

## 2020-02-26 DIAGNOSIS — C83.09 SMALL B-CELL LYMPHOMA OF EXTRANODAL SITE EXCLUDING SPLEEN AND OTHER SOLID ORGANS: ICD-10-CM

## 2020-02-26 DIAGNOSIS — C50.111 MALIGNANT NEOPLASM OF CENTRAL PORTION OF RIGHT BREAST IN FEMALE, ESTROGEN RECEPTOR POSITIVE: ICD-10-CM

## 2020-02-26 DIAGNOSIS — Z95.828 PORT-A-CATH IN PLACE: Primary | ICD-10-CM

## 2020-02-26 DIAGNOSIS — Z17.0 MALIGNANT NEOPLASM OF CENTRAL PORTION OF RIGHT BREAST IN FEMALE, ESTROGEN RECEPTOR POSITIVE: ICD-10-CM

## 2020-02-26 PROCEDURE — 99024 PR POST-OP FOLLOW-UP VISIT: ICD-10-PCS | Mod: POP,,, | Performed by: SURGERY

## 2020-02-26 PROCEDURE — 99024 POSTOP FOLLOW-UP VISIT: CPT | Mod: POP,,, | Performed by: SURGERY

## 2020-02-26 PROCEDURE — 99213 OFFICE O/P EST LOW 20 MIN: CPT | Performed by: SURGERY

## 2020-02-26 NOTE — LETTER
February 26, 2020      PRIYANK Noonan MD  [address]  [address]  [place] [address]           [facility]General Surgery  [address]  [place] [address]  Phone: [phone]  Fax: [phone]          Patient: Fanta Berg   MR Number: [id number]   YOB: 1955   Date of Visit: 2/26/2020       Dear Dr. PRIYANK Noonan:    Thank you for referring Fanta Berg to me for evaluation. Attached you will find relevant portions of my assessment and plan of care.    If you have questions, please do not hesitate to call me. I look forward to following Fanta Berg along with you.    Sincerely,    Jeff Amin III, MD    Enclosure  CC:  No Recipients    If you would like to receive this communication electronically, please contact [email or web address] or [phone] to request more information on [employer] access.    For providers and/or their staff who would like to refer a patient to [facility], please contact us through our one-stop-shop provider referral line, [facility], at [phone].    If you feel you have received this communication in error or would no longer like to receive these types of communications, please e-mail [email or web address]

## 2020-02-26 NOTE — PROGRESS NOTES
Subjective:       Patient ID: Fanta Berg is a 64 y.o. female.    Chief Complaint: Post-op Evaluation (FU DOS 2/11/20 Port Removal)      HPI:  Patient is status post removal of Port-A-Cath on February 11th.  She is doing well. No complaints today.  She reports no redness or swelling around the incision.    Review of Systems    Objective:      Physical Exam   Constitutional: She is oriented to person, place, and time. No distress.   Pulmonary/Chest: Effort normal. No respiratory distress.   Neurological: She is alert and oriented to person, place, and time.   Skin:        Incision is clean dry and intact. No evidence of infection or seroma or hematoma       Assessment/Plan:   Port-A-Cath in place    Malignant neoplasm of central portion of right breast in female, estrogen receptor positive    Small B-cell lymphoma of extranodal site excluding spleen and other solid organs      Status post removal of Port-A-Cath.  She is doing well. Incision is healing well.  Return to clinic p.r.n.  Follow up if symptoms worsen or fail to improve.

## 2020-04-24 PROBLEM — E78.5 HYPERLIPIDEMIA: Status: RESOLVED | Noted: 2017-02-14 | Resolved: 2020-04-24

## 2020-04-24 PROBLEM — Z95.828 PORT-A-CATH IN PLACE: Status: RESOLVED | Noted: 2020-02-11 | Resolved: 2020-04-24

## 2020-04-24 PROBLEM — E78.00 HYPERCHOLESTEROLEMIA: Status: ACTIVE | Noted: 2020-04-24

## 2020-06-17 ENCOUNTER — TELEPHONE (OUTPATIENT)
Dept: HEMATOLOGY/ONCOLOGY | Facility: CLINIC | Age: 65
End: 2020-06-17

## 2020-06-17 NOTE — TELEPHONE ENCOUNTER
----- Message from Helen Suarez sent at 6/16/2020  8:36 AM CDT -----  The patient said she is having swelling under her right implant with discomfort and itching. She said she feels pressure. She said the implant is still soft. Please call her back at [phone].

## 2020-06-17 NOTE — TELEPHONE ENCOUNTER
Spoke to pt regarding implants.  Surgeon that placed implants was 15 years ago at [facility].  Pt will need new referral placed for implants. Will talk to Dr. Noonan once he gets back from out of town.

## 2020-06-26 ENCOUNTER — OFFICE VISIT (OUTPATIENT)
Dept: HEMATOLOGY/ONCOLOGY | Facility: CLINIC | Age: 65
End: 2020-06-26
Payer: MEDICARE

## 2020-06-26 VITALS
HEART RATE: 97 BPM | BODY MASS INDEX: 26.06 KG/M2 | WEIGHT: 142.5 LBS | SYSTOLIC BLOOD PRESSURE: 123 MMHG | DIASTOLIC BLOOD PRESSURE: 86 MMHG | TEMPERATURE: 97 F

## 2020-06-26 DIAGNOSIS — Z85.72 HISTORY OF NON-HODGKIN'S LYMPHOMA: ICD-10-CM

## 2020-06-26 DIAGNOSIS — Z85.3 HISTORY OF BREAST CANCER: ICD-10-CM

## 2020-06-26 DIAGNOSIS — R07.89 RIGHT-SIDED CHEST WALL PAIN: Primary | ICD-10-CM

## 2020-06-26 DIAGNOSIS — R91.1 NODULE OF RIGHT LUNG: ICD-10-CM

## 2020-06-26 DIAGNOSIS — M48.061 NEURAL FORAMINAL STENOSIS OF LUMBAR SPINE: ICD-10-CM

## 2020-06-26 PROCEDURE — 99214 OFFICE O/P EST MOD 30 MIN: CPT | Mod: S$GLB,,, | Performed by: INTERNAL MEDICINE

## 2020-06-26 PROCEDURE — 99214 PR OFFICE/OUTPT VISIT, EST, LEVL IV, 30-39 MIN: ICD-10-PCS | Mod: S$GLB,,, | Performed by: INTERNAL MEDICINE

## 2020-06-28 NOTE — PROGRESS NOTES
[facility] hematology Oncology in office encounter progress note  2020    Subjective:      Patient ID:   Fanta Berg  64 y.o. female  1955  [place]      Chief Complaint:  Cancer follow up, HA x's    Hx of bilateral breast cancer.  Bilateral mastectomy with reconstruction.  Adjuvant tamoxifen x's 5 years.    She complains in pain and swelling or fullness at the chest wall at and behind the implant and reconstruction area of the right breast.    She has GERD,  Sx decreased on Zantac.    MGUS . Followed at [facility].    Hx small cell NHL, treated with -R x's 6, then Rituxan maintenance.  Had neuro? Sx after Rituxan 2015, Rituxan D/C ed. Low grade NHL.  Follicular vs marginal NHL. .    Chronic LBP sx 2nd DDD. S/P epidural injections.  Chronic neck pains, hx C spine fx.  On PT.    Hx HTN, rheumatoid fever as a child, GERD, arthritis sx, DDD.    Ovarian tumor removed at [facility] 2005.  R MRM   L mastectomy   T & A  Back surgery   C section x's 3  Breast implants L & R 2006  Port removed 2012  Complete hysterectomy [facility]  M0    Allergy benadryl, codiene, levaquin    Smoke rarely, ETOH no, Job , disabled    Mom small cell lung cancer  Dad pancreatic cancer  Sister HTN  Son AMI  Daughter schizophrenia  Daughter panic attacks  Mom, sisters, daughters no breast cancer    ROS:   GEN: normal without any fever, night sweats or weight loss  HEENT:See HPI.   sore throat, stiff neck, changes in vision  CV: RF as a child,     no CP, SOB, PND, MACHADO or orthopnea  PULM: normal with no SOB, cough, hemoptysis, sputum or pleuritic pain  GI: GERD+.   no abdominal pain, nausea, vomiting, constipation, diarrhea, melanotic stools, BRBPR, or hematemesis  : normal with no hematuria, dysuria  BREAST: See HPI  SKIN: normal with no rash, erythema, bruising, or swelling     Past Medical History:   Diagnosis Date    Breast cancer     Breast cancer     bilat mast, chemo,  "arimidex     [facility]    Cancer     Cardiomyopathy due to chemotherapy     DDD (degenerative disc disease)     GERD (gastroesophageal reflux disease)     NHL (nodular histiocytic lymphoma)     Non Hodgkin's lymphoma     Non-Hodgkin lymphoma     Small cell B-cell lymphoma      Past Surgical History:   Procedure Laterality Date    BACK SURGERY      BREAST RECONSTRUCTION Bilateral 2006    MD Nura     BREAST RECONSTRUCTION Bilateral 2006    MD Lyman    BREAST SURGERY       SECTION      HYSTERECTOMY      MEDIPORT REMOVAL Right 2020    Procedure: REMOVAL, CATHETER, CENTRAL VENOUS, TUNNELED WITH PORT;  Surgeon: Jeff Amin III, MD;  Location: [facility];  Service: General;  Laterality: Right;    port-a-cath placement Right     SPINE SURGERY      TONSILLECTOMY      TOTAL ABDOMINAL HYSTERECTOMY W/ BILATERAL SALPINGOOPHORECTOMY         Review of patient's allergies indicates:   Allergen Reactions    Benadryl [diphenhydramine hcl] Anaphylaxis    Levaquin [levofloxacin] Other (See Comments)     Permanent tendon & ligament damage    Ibuprofen-diphenhydramine cit Nausea Only      "Any meds with PM behind them "    Sulfamethoxazole-trimethoprim Nausea And Vomiting    Tolmetin Nausea Only    Codeine Palpitations and Rash    Duloxetine hcl Other (See Comments)     "increased sweating"         Current Outpatient Medications:     fluticasone propionate (FLONASE) 50 mcg/actuation nasal spray, 1 spray (50 mcg total) by Each Nostril route once daily., Disp: 9.9 mL, Rfl: 3      Objective:   Vitals:  Blood pressure 123/86, pulse 97, temperature 97 °F (36.1 °C), weight 64.6 kg (142 lb 8 oz).    Physical Examination:   GEN: no apparent distress, comfortable  HEAD: atraumatic and normocephalic  EYES: no pallor, no icterus  ENT: no pharyngeal erythema, external ears WNL; no nasal discharge; no thrush  NECK: no masses, thyroid normal, trachea midline, no LAD/LN's, supple  CV: RRR with no murmur; " "normal pulse; normal S1 and S2; no pedal edema  CHEST: Normal respiratory effort; CTAB; normal breath sounds; no wheeze or crackles  ABDOM: nontender and nondistended; soft;  no rebound/guarding  MUSC/Skeletal: ROM normal; no crepitus; joints normal  EXTREM: no clubbing, cyanosis, inflammation or swelling  SKIN: no rashes, lesions, ulcers, petechiae  : no CVAT  NEURO: grossly intact; motor/sensory WNL;  no tremors  PSYCH: normal mood, affect and behavior  LYMPH: normal cervical, supraclavicular, axillary and groin LN's  "BREASTS": no palpable mass.  I do not palpate fullness or tenderness on examination of the left or right chest.  She has extensive postop change with bilateral implants in place.      Labs:   Lab Results   Component Value Date    WBC 8.5 04/16/2020    HGB 15.1 04/16/2020    HCT 44.0 04/16/2020    MCV 88.7 04/16/2020     04/16/2020    CMP  Sodium   Date Value Ref Range Status   04/16/2020 141 135 - 146 mmol/L Final   10/09/2017 137 134 - 144 mmol/L      Potassium   Date Value Ref Range Status   04/16/2020 4.0 3.5 - 5.3 mmol/L Final     Chloride   Date Value Ref Range Status   04/16/2020 104 98 - 110 mmol/L Final   10/09/2017 100 98 - 110 mmol/L      CO2   Date Value Ref Range Status   04/16/2020 25 20 - 32 mmol/L Final     Glucose   Date Value Ref Range Status   04/16/2020 99 65 - 139 mg/dL Final     Comment:               Non-fasting reference interval        10/09/2017 87 70 - 99 mg/dL      BUN, Bld   Date Value Ref Range Status   04/16/2020 10 7 - 25 mg/dL Final     Creatinine   Date Value Ref Range Status   04/16/2020 0.82 0.50 - 0.99 mg/dL Final     Comment:     For patients >49 years of age, the reference limit  for Creatinine is approximately 13% higher for people  identified as -American.        10/09/2017 0.63 0.60 - 1.40 mg/dL      Calcium   Date Value Ref Range Status   04/16/2020 9.7 8.6 - 10.4 mg/dL Final     Total Protein   Date Value Ref Range Status   04/16/2020 6.8 " 6.1 - 8.1 g/dL Final     Albumin   Date Value Ref Range Status   04/16/2020 4.4 3.6 - 5.1 g/dL Final   10/09/2017 4.3 3.1 - 4.7 g/dL      Total Bilirubin   Date Value Ref Range Status   04/16/2020 0.6 0.2 - 1.2 mg/dL Final     Alkaline Phosphatase   Date Value Ref Range Status   04/16/2020 79 37 - 153 U/L Final     AST   Date Value Ref Range Status   04/16/2020 14 10 - 35 U/L Final     ALT   Date Value Ref Range Status   04/16/2020 10 6 - 29 U/L Final     Anion Gap   Date Value Ref Range Status   02/06/2020 11 8 - 16 mmol/L Final   02/06/2020 11 8 - 16 mmol/L Final     eGFR if    Date Value Ref Range Status   04/16/2020 86 > OR = 60 mL/min/1.73m2 Final     eGFR if non    Date Value Ref Range Status   04/16/2020 75 > OR = 60 mL/min/1.73m2 Final     PET scan and lab ordered for now.    Radiology/Diagnostic Studies:    PET no metastatic dx, no NHL seen, SONYA 12/2018.    CAT 9/6/19 RML 8 mm nodule, due for repeat CAT 12/18/19.  PET scan 9/13/19 no increased uptake. At nodule.    U/S of leg no DVT 11/26/19.    Assessment:   (1) 64 y.o. female with diagnosis of  Bilateral breast cancer 2005, low grade NHL 2013.    No evidence for recurrent breast cancer or NHL. On last Pet 9/2019..    RML pulmonary nodule, re eval with CAT 12/18/19.    Now with symptoms at the right chest area, possible recurrence disease, will check PET scan now and return to clinic in 1 month.  Also ultrasound of right breast area.

## 2020-07-09 ENCOUNTER — HOSPITAL ENCOUNTER (OUTPATIENT)
Dept: RADIOLOGY | Facility: HOSPITAL | Age: 65
Discharge: HOME | End: 2020-07-09
Attending: INTERNAL MEDICINE
Payer: MEDICARE

## 2020-07-09 VITALS — BODY MASS INDEX: 26.5 KG/M2 | HEIGHT: 62 IN | WEIGHT: 144 LBS

## 2020-07-09 DIAGNOSIS — R91.1 NODULE OF RIGHT LUNG: ICD-10-CM

## 2020-07-09 DIAGNOSIS — Z85.3 HISTORY OF BREAST CANCER: ICD-10-CM

## 2020-07-09 DIAGNOSIS — R07.89 RIGHT-SIDED CHEST WALL PAIN: ICD-10-CM

## 2020-07-09 DIAGNOSIS — Z85.72 HISTORY OF NON-HODGKIN'S LYMPHOMA: ICD-10-CM

## 2020-07-09 LAB — GLUCOSE SERPL-MCNC: 94 MG/DL (ref 70–110)

## 2020-07-09 PROCEDURE — 78815 PET IMAGE W/CT SKULL-THIGH: CPT | Mod: TC,PO,PS

## 2020-07-09 PROCEDURE — A9552 F18 FDG: HCPCS | Mod: PO

## 2020-07-22 ENCOUNTER — TELEPHONE (OUTPATIENT)
Dept: HEMATOLOGY/ONCOLOGY | Facility: CLINIC | Age: 65
End: 2020-07-22

## 2020-08-21 ENCOUNTER — HOSPITAL ENCOUNTER (OUTPATIENT)
Dept: RADIOLOGY | Facility: HOSPITAL | Age: 65
Discharge: HOME | End: 2020-08-21
Attending: INTERNAL MEDICINE
Payer: MEDICARE

## 2020-08-21 DIAGNOSIS — R07.89 RIGHT-SIDED CHEST WALL PAIN: ICD-10-CM

## 2020-08-21 DIAGNOSIS — Z85.3 HX OF BREAST CANCER: ICD-10-CM

## 2020-08-21 PROCEDURE — 76641 ULTRASOUND BREAST COMPLETE: CPT | Mod: TC,PO,RT

## 2021-01-18 ENCOUNTER — OFFICE VISIT (OUTPATIENT)
Dept: HEMATOLOGY/ONCOLOGY | Facility: CLINIC | Age: 66
End: 2021-01-18
Payer: MEDICARE

## 2021-01-18 VITALS
HEART RATE: 58 BPM | RESPIRATION RATE: 18 BRPM | WEIGHT: 143 LBS | DIASTOLIC BLOOD PRESSURE: 83 MMHG | BODY MASS INDEX: 26.16 KG/M2 | SYSTOLIC BLOOD PRESSURE: 144 MMHG

## 2021-01-18 DIAGNOSIS — Z85.3 HISTORY OF BREAST CANCER: Primary | ICD-10-CM

## 2021-01-18 DIAGNOSIS — C83.09 SMALL B-CELL LYMPHOMA OF EXTRANODAL SITE EXCLUDING SPLEEN AND OTHER SOLID ORGANS: ICD-10-CM

## 2021-01-18 DIAGNOSIS — R91.1 NODULE OF RIGHT LUNG: ICD-10-CM

## 2021-01-18 PROCEDURE — 99214 OFFICE O/P EST MOD 30 MIN: CPT | Mod: S$GLB,,, | Performed by: INTERNAL MEDICINE

## 2021-01-18 PROCEDURE — 99214 PR OFFICE/OUTPT VISIT, EST, LEVL IV, 30-39 MIN: ICD-10-PCS | Mod: S$GLB,,, | Performed by: INTERNAL MEDICINE

## 2021-03-02 ENCOUNTER — HOSPITAL ENCOUNTER (OUTPATIENT)
Dept: RADIOLOGY | Facility: HOSPITAL | Age: 66
Discharge: HOME | End: 2021-03-02
Attending: NURSE PRACTITIONER
Payer: MEDICARE

## 2021-03-02 DIAGNOSIS — M25.562 ACUTE PAIN OF LEFT KNEE: ICD-10-CM

## 2021-03-02 PROCEDURE — 73562 XR KNEE 3 VIEW LEFT: ICD-10-PCS | Mod: 26,LT,, | Performed by: RADIOLOGY

## 2021-03-02 PROCEDURE — 73562 X-RAY EXAM OF KNEE 3: CPT | Mod: TC,FY,LT

## 2021-03-02 PROCEDURE — 73562 X-RAY EXAM OF KNEE 3: CPT | Mod: 26,LT,, | Performed by: RADIOLOGY

## 2021-08-05 ENCOUNTER — HOSPITAL ENCOUNTER (OUTPATIENT)
Dept: RADIOLOGY | Facility: HOSPITAL | Age: 66
Discharge: HOME | End: 2021-08-05
Attending: NURSE PRACTITIONER
Payer: MEDICARE

## 2021-08-05 DIAGNOSIS — R10.11 RIGHT UPPER QUADRANT ABDOMINAL PAIN: ICD-10-CM

## 2021-08-05 PROCEDURE — 76700 US ABDOMEN COMPLETE: ICD-10-PCS | Mod: 26,,, | Performed by: RADIOLOGY

## 2021-08-05 PROCEDURE — 76700 US EXAM ABDOM COMPLETE: CPT | Mod: TC

## 2021-08-05 PROCEDURE — 76700 US EXAM ABDOM COMPLETE: CPT | Mod: 26,,, | Performed by: RADIOLOGY

## 2021-10-27 ENCOUNTER — HOSPITAL ENCOUNTER (OUTPATIENT)
Dept: RADIOLOGY | Facility: HOSPITAL | Age: 66
Discharge: HOME | End: 2021-10-27
Attending: NURSE PRACTITIONER
Payer: MEDICARE

## 2021-10-27 DIAGNOSIS — R10.11 RIGHT UPPER QUADRANT PAIN: ICD-10-CM

## 2021-10-27 PROCEDURE — 78227 HEPATOBIL SYST IMAGE W/DRUG: CPT | Mod: 26,,, | Performed by: RADIOLOGY

## 2021-10-27 PROCEDURE — 78227 HEPATOBIL SYST IMAGE W/DRUG: CPT | Mod: TC

## 2021-10-27 PROCEDURE — 78227 NM HEPATOBILIARY(HIDA) WITH PHARM AND EF: ICD-10-PCS | Mod: 26,,, | Performed by: RADIOLOGY

## 2022-01-03 ENCOUNTER — OFFICE VISIT (OUTPATIENT)
Dept: HEMATOLOGY/ONCOLOGY | Facility: CLINIC | Age: 67
End: 2022-01-03
Payer: MEDICARE

## 2022-01-03 VITALS
WEIGHT: 140.88 LBS | BODY MASS INDEX: 25.77 KG/M2 | DIASTOLIC BLOOD PRESSURE: 83 MMHG | TEMPERATURE: 98 F | SYSTOLIC BLOOD PRESSURE: 136 MMHG | HEART RATE: 68 BPM

## 2022-01-03 DIAGNOSIS — R91.1 NODULE OF RIGHT LUNG: ICD-10-CM

## 2022-01-03 DIAGNOSIS — C83.09 SMALL B-CELL LYMPHOMA OF EXTRANODAL SITE EXCLUDING SPLEEN AND OTHER SOLID ORGANS: ICD-10-CM

## 2022-01-03 DIAGNOSIS — Z85.3 HISTORY OF BREAST CANCER: Primary | ICD-10-CM

## 2022-01-03 PROCEDURE — 99214 OFFICE O/P EST MOD 30 MIN: CPT | Mod: S$GLB,,, | Performed by: INTERNAL MEDICINE

## 2022-01-03 PROCEDURE — 99214 PR OFFICE/OUTPT VISIT, EST, LEVL IV, 30-39 MIN: ICD-10-PCS | Mod: S$GLB,,, | Performed by: INTERNAL MEDICINE

## 2022-01-04 NOTE — PROGRESS NOTES
[facility] hematology Oncology in office subsequent encounter note  1/3/22    Subjective:      Patient ID:   Fanta Berg  66 y.o. female  1955  [place]      Chief Complaint:  History of non-Hodgkin's lymphoma   Hx of bilateral breast cancer.  Bilateral mastectomy with reconstruction.  Adjuvant tamoxifen x's 5 years.    She has no complaints today, says she is doing well overall.  She is following isolation precautions at home because of the pan epidemic.  Discussed with her the COVID-19 vaccine.  She does not feel that she will be getting the vaccine, she does not feel that she needs it.    She has GERD,  Sx decreased on Zantac.  She was recently evaluated for GB sx.    MGUS . Followed at [facility].    Hx small cell NHL, treated with -R x's , then Rituxan maintenance.  Had neuro? Sx after Rituxan 2015, Rituxan D/C ed. Low grade NHL.  Follicular vs marginal NHL. .    Chronic LBP sx 2nd DDD. S/P epidural injections.  Chronic neck pains, hx C spine fx.  On PT.    Hx HTN, rheumatoid fever as a child, GERD, arthritis sx, DDD.    Ovarian tumor removed at [facility] 2005.  R MRM 2005  L mastectomy   T & A  Back surgery   C section x's 3  Breast implants L & R 2006  Port removed 2012  Complete hysterectomy [facility]  M0    Allergy benadryl, codiene, levaquin    Smoke rarely, ETOH no, Job , disabled    Mom small cell lung cancer  Dad pancreatic cancer  Sister HTN  Son AMI  Daughter schizophrenia  Daughter panic attacks  Mom, sisters, daughters no breast cancer    ROS:   GEN: normal without any fever, night sweats or weight loss  HEENT:See HPI.   sore throat, stiff neck, changes in vision  CV: RF as a child,     no CP, SOB, PND, MACHADO or orthopnea  PULM: normal with no SOB, cough, hemoptysis, sputum or pleuritic pain  GI: GERD+.   no abdominal pain, nausea, vomiting, constipation, diarrhea, melanotic stools, BRBPR, or hematemesis  : normal with no hematuria,  "dysuria  BREAST: See HPI  SKIN: normal with no rash, erythema, bruising, or swelling     Past Medical History:   Diagnosis Date    Breast cancer     Breast cancer 2005    bilat mast, chemo, arimidex     mda    Cancer     Cardiomyopathy due to chemotherapy     DDD (degenerative disc disease)     GERD (gastroesophageal reflux disease)     NHL (nodular histiocytic lymphoma)     Non Hodgkin's lymphoma     Non-Hodgkin lymphoma     Small cell B-cell lymphoma      Past Surgical History:   Procedure Laterality Date    BACK SURGERY      BREAST RECONSTRUCTION Bilateral 2006    MD Lyman     BREAST RECONSTRUCTION Bilateral     MD Lyman    BREAST SURGERY       SECTION      HYSTERECTOMY      MEDIPORT REMOVAL Right 2020    Procedure: REMOVAL, CATHETER, CENTRAL VENOUS, TUNNELED WITH PORT;  Surgeon: Jeff Amin III, MD;  Location: [facility];  Service: General;  Laterality: Right;    port-a-cath placement Right     SPINE SURGERY      TONSILLECTOMY      TOTAL ABDOMINAL HYSTERECTOMY W/ BILATERAL SALPINGOOPHORECTOMY         Review of patient's allergies indicates:   Allergen Reactions    Benadryl [diphenhydramine hcl] Anaphylaxis    Levaquin [levofloxacin] Other (See Comments)     Permanent tendon & ligament damage    Ibuprofen-diphenhydramine cit Nausea Only      "Any meds with PM behind them "    Sulfamethoxazole-trimethoprim Nausea And Vomiting    Tolmetin Nausea Only    Codeine Palpitations and Rash    Duloxetine hcl Other (See Comments)     "increased sweating"         Current Outpatient Medications:     acyclovir 5% (ZOVIRAX) 5 % ointment, APPLY TOPICALLY 5 (FIVE) TIMES DAILY., Disp: 30 g, Rfl: 1    ascorbic acid/multivit-min (VITAMIN C FIZZY DRINK ORAL), Take 1,000 mg by mouth once daily., Disp: , Rfl:     cholecalciferol, vitamin D3, (VITAMIN D3) 125 mcg (5,000 unit) Tab, Take 5,000 Units by mouth once daily., Disp: , Rfl:     fluticasone propionate (FLONASE) 50 " "mcg/actuation nasal spray, 2 sprays (100 mcg total) by Each Nostril route once daily., Disp: 15.8 mL, Rfl: 3    ibuprofen (ADVIL,MOTRIN) 800 MG tablet, Take 1 tablet (800 mg total) by mouth every 8 (eight) hours as needed for Pain., Disp: 60 tablet, Rfl: 0    mupirocin (BACTROBAN) 2 % ointment, Apply 22 g topically once daily., Disp: 22 g, Rfl: 1    simethicone (MYLICON) 125 mg Cap capsule, Take 125 mg by mouth daily as needed for Flatulence., Disp: , Rfl:       Objective:   Vitals:  Blood pressure 136/83, pulse 68, temperature 98.2 °F (36.8 °C), weight 63.9 kg (140 lb 14 oz).    Physical Examination:   GEN: no apparent distress, comfortable  HEAD: atraumatic and normocephalic  EYES: no pallor, no icterus  ENT: no pharyngeal erythema, external ears WNL; no nasal discharge; no thrush  NECK: no masses, thyroid normal, trachea midline, no LAD/LN's, supple  CV: RRR with no murmur; normal pulse; normal S1 and S2; no pedal edema  CHEST: Normal respiratory effort; CTAB; normal breath sounds; no wheeze or crackles  ABDOM: nontender and nondistended; soft;  no rebound/guarding  MUSC/Skeletal: ROM normal; no crepitus; joints normal  EXTREM: no clubbing, cyanosis, inflammation or swelling  SKIN: no rashes, lesions, ulcers, petechiae  : no CVAT  NEURO: grossly intact; motor/sensory WNL;  no tremors  PSYCH: normal mood, affect and behavior  LYMPH: normal cervical, supraclavicular, axillary and groin LN's  "BREASTS": no palpable mass.  I do not palpate fullness or tenderness on examination of the left or right chest.  She has extensive postop change with bilateral implants in place.      Labs:   Lab Results   Component Value Date    WBC 5.7 08/03/2021    HGB 13.5 08/03/2021    HCT 39.4 08/03/2021    MCV 90.0 08/03/2021     08/03/2021    CMP  Sodium   Date Value Ref Range Status   08/03/2021 140 135 - 146 mmol/L Final   10/09/2017 137 134 - 144 mmol/L      Potassium   Date Value Ref Range Status   08/03/2021 4.5 3.5 - " 5.3 mmol/L Final     Chloride   Date Value Ref Range Status   08/03/2021 105 98 - 110 mmol/L Final   10/09/2017 100 98 - 110 mmol/L      CO2   Date Value Ref Range Status   08/03/2021 26 20 - 32 mmol/L Final     Glucose   Date Value Ref Range Status   08/03/2021 80 65 - 99 mg/dL Final     Comment:                   Fasting reference interval        10/09/2017 87 70 - 99 mg/dL      BUN   Date Value Ref Range Status   08/03/2021 16 7 - 25 mg/dL Final     Creatinine   Date Value Ref Range Status   08/03/2021 0.70 0.50 - 0.99 mg/dL Final     Comment:     For patients >49 years of age, the reference limit  for Creatinine is approximately 13% higher for people  identified as -American.        10/09/2017 0.63 0.60 - 1.40 mg/dL      Calcium   Date Value Ref Range Status   08/03/2021 9.7 8.6 - 10.4 mg/dL Final     Total Protein   Date Value Ref Range Status   08/03/2021 6.5 6.1 - 8.1 g/dL Final     Albumin   Date Value Ref Range Status   08/03/2021 4.4 3.6 - 5.1 g/dL Final   10/09/2017 4.3 3.1 - 4.7 g/dL      Total Bilirubin   Date Value Ref Range Status   08/03/2021 0.4 0.2 - 1.2 mg/dL Final     Alkaline Phosphatase   Date Value Ref Range Status   04/16/2020 79 37 - 153 U/L Final     AST   Date Value Ref Range Status   08/03/2021 19 10 - 35 U/L Final     ALT   Date Value Ref Range Status   08/03/2021 16 6 - 29 U/L Final     Anion Gap   Date Value Ref Range Status   02/06/2020 11 8 - 16 mmol/L Final   02/06/2020 11 8 - 16 mmol/L Final     eGFR if    Date Value Ref Range Status   08/03/2021 104 > OR = 60 mL/min/1.73m2 Final     eGFR if non    Date Value Ref Range Status   08/03/2021 90 > OR = 60 mL/min/1.73m2 Final       Radiology/Diagnostic Studies:    PET scan July 2020 does not show evidence for recurrence or metastatic cancer, pulmonary nodule is stable since 2013.  Marta catheter was removed in February 2020.    Assessment:   (1) 66 y.o. female with diagnosis of  Bilateral breast  cancer 2005, low grade NHL 2013.    No evidence for recurrent breast cancer or NHL, on last Pet 7/2020.    RML pulmonary nodule, stable since 2013.    Observe for now.  Return to clinic in 12 month.

## 2022-02-02 ENCOUNTER — PATIENT OUTREACH (OUTPATIENT)
Dept: ADMINISTRATIVE | Facility: HOSPITAL | Age: 67
End: 2022-02-02
Payer: MEDICARE

## 2022-02-02 NOTE — PROGRESS NOTES
Non-compliant report chart audits. Chart review completed for  test overdue (mammograms, Colonoscopies, pap smears, DM labs, and/or EYE EXAMs)      Care Everywhere and media, updates requested and reviewed.      DIS reviewed for test needed.     HM updated with external colonoscopy report.

## 2022-04-17 ENCOUNTER — HOSPITAL ENCOUNTER (EMERGENCY)
Facility: HOSPITAL | Age: 67
Discharge: HOME | End: 2022-04-17
Payer: MEDICARE

## 2022-04-17 VITALS
RESPIRATION RATE: 15 BRPM | WEIGHT: 131 LBS | OXYGEN SATURATION: 99 % | HEIGHT: 61 IN | SYSTOLIC BLOOD PRESSURE: 148 MMHG | TEMPERATURE: 98 F | HEART RATE: 64 BPM | DIASTOLIC BLOOD PRESSURE: 87 MMHG | BODY MASS INDEX: 24.73 KG/M2

## 2022-04-17 DIAGNOSIS — S62.639A CLOSED FRACTURE OF TUFT OF DISTAL PHALANX OF FINGER: Primary | ICD-10-CM

## 2022-04-17 PROCEDURE — 99283 EMERGENCY DEPT VISIT LOW MDM: CPT

## 2022-04-17 PROCEDURE — 73140 X-RAY EXAM OF FINGER(S): CPT | Mod: TC,FY,RT

## 2022-04-17 PROCEDURE — 73140 X-RAY EXAM OF FINGER(S): CPT | Mod: 26,RT,, | Performed by: RADIOLOGY

## 2022-04-17 PROCEDURE — 73140 XR FINGER 2 OR MORE VIEWS RIGHT: ICD-10-PCS | Mod: 26,RT,, | Performed by: RADIOLOGY

## 2022-04-17 PROCEDURE — 29130 APPL FINGER SPLINT STATIC: CPT | Mod: F8

## 2022-04-17 NOTE — DISCHARGE INSTRUCTIONS
Keep finger elevated, try to avoid used for the least the next week.  Return with any signs of swelling or infection or worsening pain.

## 2022-04-17 NOTE — ED PROVIDER NOTES
"Encounter Date: 4/17/2022       History     Chief Complaint   Patient presents with    Hand Pain     Pt smashed right ring finger between some bricks yesterday - hematoma - no loss of motor function or sensation      Pleasant 66-year-old female comes in with complaints of right 4th finger injury.  The patient was moving large rocks.  Her finger became trapped between 1 rock when another fell on it.  She hurt the distal phalanx.  She now presents with a purple distal phalanx.  There was a 70% subungual hematoma the fingernail.  It is round in shape.  She states the interesting aspect is that she feels no pain in the finger.  She has sensation in the finger but did does not hurt from the injury.  Was concerned that she is a bilateral mastectomy patient with little to no lymph nodes in the bilateral axillary regions.  Concern for possible infection.        Review of patient's allergies indicates:   Allergen Reactions    Benadryl [diphenhydramine hcl] Anaphylaxis    Levaquin [levofloxacin] Other (See Comments)     PATIENT STATES THIS MEDICATION CAUSES HER JOINT PAIN AND WEAKNESS  n Tendinopathy  Tendon, Ligament damage from use after infection  Permanent tendon & ligament damage    Sulfamethoxazole-trimethoprim Nausea And Vomiting    Tolmetin Nausea Only    Codeine Palpitations and Rash     Breathing difficulty    Duloxetine hcl Other (See Comments)     "increased sweating"     Past Medical History:   Diagnosis Date    Breast cancer     Breast cancer 2005    bilat mast, chemo, arimidex     mda    Cancer     Cardiomyopathy due to chemotherapy     DDD (degenerative disc disease)     GERD (gastroesophageal reflux disease)     NHL (nodular histiocytic lymphoma)     Non Hodgkin's lymphoma     Non-Hodgkin lymphoma     Small cell B-cell lymphoma      Past Surgical History:   Procedure Laterality Date    BACK SURGERY      BREAST RECONSTRUCTION Bilateral 2006    MD Lyman     BREAST RECONSTRUCTION Bilateral " 2006    MD Lyman    BREAST SURGERY       SECTION      COLONOSCOPY  2012    NARA THOMAS MD    ESOPHAGOGASTRODUODENOSCOPY  2012    NARA THOMAS MD    HYSTERECTOMY      MEDIPORT REMOVAL Right 2020    Procedure: REMOVAL, CATHETER, CENTRAL VENOUS, TUNNELED WITH PORT;  Surgeon: Jeff Amin III, MD;  Location: [facility];  Service: General;  Laterality: Right;    port-a-cath placement Right 2015    SPINE SURGERY      TONSILLECTOMY      TOTAL ABDOMINAL HYSTERECTOMY W/ BILATERAL SALPINGOOPHORECTOMY       Family History   Problem Relation Age of Onset    Lung cancer Mother     Brain cancer Mother     Liver cancer Father     Heart failure Son     Heart disease Son      Social History     Tobacco Use    Smoking status: Former Smoker     Packs/day: 1.00     Start date:      Quit date: 2022     Years since quittin.0    Smokeless tobacco: Never Used   Substance Use Topics    Alcohol use: No    Drug use: No     Review of Systems   Constitutional: Negative.    HENT: Negative.    Respiratory: Negative.    Cardiovascular: Negative.    Gastrointestinal: Negative.    Musculoskeletal: Negative.    All other systems reviewed and are negative.      Physical Exam     Initial Vitals [22 0637]   BP Pulse Resp Temp SpO2   (!) 148/87 64 15 98.4 °F (36.9 °C) 99 %      MAP       --         Physical Exam    Nursing note and vitals reviewed.  Constitutional: She appears well-developed and well-nourished.   Musculoskeletal:      Comments: Right 4th finger distal phalanx purple on the of pulp side. Nail bed with 70% area of subungal hematoma.  The nail appears intact.  No signs of infection.           ED Course   Procedures  Labs Reviewed - No data to display       Imaging Results          X-Ray Finger 2 or More Views Right (In process)               X-Rays:   Independently Interpreted Readings:   Other Readings:  Possible mild small tuft fracture right 4th distal  phalanx.    Medications - No data to display  Medical Decision Making:   Differential Diagnosis:   Fracture, subungual hematoma, dislocation, vascular injury.  ED Management:  The patient is stable.  Surprisingly pain-free.  I will get an x-ray to see if she has a tuft fracture.    Questionable very mild volar tuft fracture.  Will put a finger cot on.  Expectant management.  Elevate extremity.  Follow up as necessary.                      Clinical Impression:   Final diagnoses:  [B39.094R] Closed fracture of tuft of distal phalanx of finger (Primary)          ED Disposition Condition    Discharge Stable        ED Prescriptions     None        Follow-up Information    None          Min Lou MD  04/17/22 [id number]

## 2022-04-17 NOTE — ED NOTES
Pt arrived to ED through triage with c/o right ring finger bruising and swelling - pt states she smashed her finger between bricks while building a flower bed yesterday. Pt has full sensation and movement. Distal tip of right ring finger and nail are black. Pt is aaox3, NAD noted - VSS - pt resting on stretcher comfortably.

## 2022-04-19 ENCOUNTER — HOSPITAL ENCOUNTER (OUTPATIENT)
Dept: RADIOLOGY | Facility: HOSPITAL | Age: 67
Discharge: HOME | End: 2022-04-19
Attending: NURSE PRACTITIONER
Payer: MEDICARE

## 2022-04-19 DIAGNOSIS — M79.671 BILATERAL FOOT PAIN: ICD-10-CM

## 2022-04-19 DIAGNOSIS — M25.571 ACUTE BILATERAL ANKLE PAIN: ICD-10-CM

## 2022-04-19 DIAGNOSIS — M25.572 ACUTE BILATERAL ANKLE PAIN: ICD-10-CM

## 2022-04-19 DIAGNOSIS — M79.672 BILATERAL FOOT PAIN: ICD-10-CM

## 2022-04-19 PROCEDURE — 73620 X-RAY EXAM OF FOOT: CPT | Mod: TC,50,FY

## 2022-04-19 PROCEDURE — 73620 X-RAY EXAM OF FOOT: CPT | Mod: 26,50,, | Performed by: RADIOLOGY

## 2022-04-19 PROCEDURE — 73620 XR FOOT AP BILAT: ICD-10-PCS | Mod: 26,50,, | Performed by: RADIOLOGY

## 2022-09-15 ENCOUNTER — HOSPITAL ENCOUNTER (OUTPATIENT)
Dept: RADIOLOGY | Facility: HOSPITAL | Age: 67
Discharge: HOME | End: 2022-09-15
Attending: NURSE PRACTITIONER
Payer: MEDICARE

## 2022-09-15 DIAGNOSIS — M51.36 DDD (DEGENERATIVE DISC DISEASE), LUMBAR: ICD-10-CM

## 2022-09-15 DIAGNOSIS — G89.29 CHRONIC BILATERAL LOW BACK PAIN WITHOUT SCIATICA: ICD-10-CM

## 2022-09-15 DIAGNOSIS — G89.29 CHRONIC MIDLINE THORACIC BACK PAIN: ICD-10-CM

## 2022-09-15 DIAGNOSIS — M54.6 CHRONIC MIDLINE THORACIC BACK PAIN: ICD-10-CM

## 2022-09-15 DIAGNOSIS — M54.50 CHRONIC BILATERAL LOW BACK PAIN WITHOUT SCIATICA: ICD-10-CM

## 2022-09-15 DIAGNOSIS — M51.36 BULGING LUMBAR DISC: ICD-10-CM

## 2022-09-15 DIAGNOSIS — M54.9 DORSALGIA, UNSPECIFIED: ICD-10-CM

## 2022-09-15 DIAGNOSIS — Z98.890 HISTORY OF LUMBAR SURGERY: ICD-10-CM

## 2022-09-15 PROCEDURE — 72070 X-RAY EXAM THORAC SPINE 2VWS: CPT | Mod: 26,,, | Performed by: RADIOLOGY

## 2022-09-15 PROCEDURE — 72070 X-RAY EXAM THORAC SPINE 2VWS: CPT | Mod: TC

## 2022-09-15 PROCEDURE — 72148 MRI LUMBAR SPINE WITHOUT CONTRAST: ICD-10-PCS | Mod: 26,,, | Performed by: RADIOLOGY

## 2022-09-15 PROCEDURE — 72148 MRI LUMBAR SPINE W/O DYE: CPT | Mod: TC

## 2022-09-15 PROCEDURE — 72070 XR THORACIC SPINE AP LATERAL: ICD-10-PCS | Mod: 26,,, | Performed by: RADIOLOGY

## 2022-09-15 PROCEDURE — 72148 MRI LUMBAR SPINE W/O DYE: CPT | Mod: 26,,, | Performed by: RADIOLOGY

## 2022-09-21 ENCOUNTER — TELEPHONE (OUTPATIENT)
Dept: FAMILY MEDICINE | Facility: CLINIC | Age: 67
End: 2022-09-21
Payer: MEDICARE

## 2022-11-01 ENCOUNTER — HOSPITAL ENCOUNTER (OUTPATIENT)
Dept: RADIOLOGY | Facility: HOSPITAL | Age: 67
Discharge: HOME | End: 2022-11-01
Attending: NURSE PRACTITIONER
Payer: MEDICARE

## 2022-11-01 DIAGNOSIS — Z85.3 HISTORY OF BREAST CANCER: ICD-10-CM

## 2022-11-01 DIAGNOSIS — I89.0 LYMPHEDEMA: ICD-10-CM

## 2022-11-01 DIAGNOSIS — Z85.72 HISTORY OF NON-HODGKIN'S LYMPHOMA: ICD-10-CM

## 2022-11-01 PROCEDURE — 71046 X-RAY EXAM CHEST 2 VIEWS: CPT | Mod: 26,,, | Performed by: RADIOLOGY

## 2022-11-01 PROCEDURE — 71046 X-RAY EXAM CHEST 2 VIEWS: CPT | Mod: TC

## 2022-11-01 PROCEDURE — 71046 XR CHEST PA AND LATERAL: ICD-10-PCS | Mod: 26,,, | Performed by: RADIOLOGY

## 2022-11-08 ENCOUNTER — HOSPITAL ENCOUNTER (OUTPATIENT)
Dept: RADIOLOGY | Facility: HOSPITAL | Age: 67
Discharge: HOME | End: 2022-11-08
Attending: NURSE PRACTITIONER
Payer: MEDICARE

## 2022-11-08 DIAGNOSIS — Z85.72 HISTORY OF NON-HODGKIN'S LYMPHOMA: ICD-10-CM

## 2022-11-08 DIAGNOSIS — R59.1 GENERALIZED ENLARGED LYMPH NODES: ICD-10-CM

## 2022-11-08 DIAGNOSIS — Z85.3 HISTORY OF BREAST CANCER: ICD-10-CM

## 2022-11-08 DIAGNOSIS — I89.0 LYMPHEDEMA: ICD-10-CM

## 2022-11-08 PROCEDURE — 76882 US AXILLA ONLY (BREAST IMAGING) RIGHT: ICD-10-PCS | Mod: 26,RT,, | Performed by: RADIOLOGY

## 2022-11-08 PROCEDURE — 76882 US LMTD JT/FCL EVL NVASC XTR: CPT | Mod: TC,RT

## 2022-11-08 PROCEDURE — 76882 US LMTD JT/FCL EVL NVASC XTR: CPT | Mod: 26,RT,, | Performed by: RADIOLOGY

## 2022-11-18 ENCOUNTER — OFFICE VISIT (OUTPATIENT)
Dept: PAIN MEDICINE | Facility: CLINIC | Age: 67
End: 2022-11-18
Payer: MEDICARE

## 2022-11-18 VITALS
HEIGHT: 61 IN | DIASTOLIC BLOOD PRESSURE: 69 MMHG | SYSTOLIC BLOOD PRESSURE: 127 MMHG | WEIGHT: 139 LBS | BODY MASS INDEX: 26.24 KG/M2 | HEART RATE: 69 BPM

## 2022-11-18 DIAGNOSIS — S22.009D CLOSED FRACTURE OF THORACIC VERTEBRA WITH ROUTINE HEALING, UNSPECIFIED FRACTURE MORPHOLOGY, UNSPECIFIED THORACIC VERTEBRAL LEVEL, SUBSEQUENT ENCOUNTER: Primary | ICD-10-CM

## 2022-11-18 DIAGNOSIS — M51.36 DDD (DEGENERATIVE DISC DISEASE), LUMBAR: ICD-10-CM

## 2022-11-18 DIAGNOSIS — Z98.890 HISTORY OF LUMBAR SURGERY: ICD-10-CM

## 2022-11-18 DIAGNOSIS — M51.36 BULGING LUMBAR DISC: ICD-10-CM

## 2022-11-18 DIAGNOSIS — M51.34 DDD (DEGENERATIVE DISC DISEASE), THORACIC: ICD-10-CM

## 2022-11-18 PROCEDURE — 99213 OFFICE O/P EST LOW 20 MIN: CPT | Mod: PBBFAC,PN | Performed by: ANESTHESIOLOGY

## 2022-11-18 PROCEDURE — 99999 PR PBB SHADOW E&M-EST. PATIENT-LVL III: CPT | Mod: PBBFAC,,, | Performed by: ANESTHESIOLOGY

## 2022-11-18 PROCEDURE — 99204 OFFICE O/P NEW MOD 45 MIN: CPT | Mod: S$PBB,,, | Performed by: ANESTHESIOLOGY

## 2022-11-18 PROCEDURE — 99204 PR OFFICE/OUTPT VISIT, NEW, LEVL IV, 45-59 MIN: ICD-10-PCS | Mod: S$PBB,,, | Performed by: ANESTHESIOLOGY

## 2022-11-18 PROCEDURE — 99999 PR PBB SHADOW E&M-EST. PATIENT-LVL III: ICD-10-PCS | Mod: PBBFAC,,, | Performed by: ANESTHESIOLOGY

## 2022-11-18 RX ORDER — CELECOXIB 100 MG/1
100 CAPSULE ORAL 2 TIMES DAILY
Qty: 60 CAPSULE | Refills: 2 | Status: SHIPPED | OUTPATIENT
Start: 2022-11-18 | End: 2023-03-06

## 2022-11-18 NOTE — PROGRESS NOTES
Referring Physician: Hattie Xiong NP    PCP: Bharath Pierre III, MD    CC: back pain    HPI:   Fanta Berg is a 67 y.o. female with PMH significant for hx of breast cancer and hx of lymphoma presents as a referral for the evaluation of back pain. The patient reports that her pain began approximately since 2005 which she attributes to chemotherapy. The patient reports of acute worsening of her back pain ~ 6 months ago after no inciting incident or trauma. She attributes this to doing more physically. The patient localizes her pain to the area across her thoracic spine at the level of her braline. The patient reports of radiation to her ribs. The patient describes her pain as an aching and burning type of pain. The patient denies of numbness. The patient reports that her pain is a 6/10. Patient denies of any urinary/fecal incontinence, saddle anesthesia, or weakness.     Aggravating factors: physical activity in general    Mitigating factors: rest    Relevant Surgeries: yes; hx of lumbar spine surgery in [place]; ~1990s.     Interventional Therapies: yes; hx of injection X 1 via [facility]     : Not applicable    Non-pharmacologic Treatment:     Physical Therapy: no  Ice/Heat: no  TENS: no  Massage: no  Chiropractic care: no  Acupuncture: no         Pain Medications:         Currently taking: Voltaren gel    Has tried in the past:    Opioids: yes; previously with [facility] until 2019 when she discontinued her opioids as she was feeling better. Previously took Loratab  NSAIDS: yes; ibuprofen 800 mg - GI symptoms   Tylenol: yes  Muscle relaxants: no  TCAs: no  SNRIs: no  Anticonvulsants: yes; gabapentin in the past with side effects  topical creams: yes    Anticoagulation: no    ROS:  Review of Systems   Constitutional:  Negative for chills and fever.   HENT:  Negative for sore throat.    Eyes:  Negative for visual disturbance.   Respiratory:  Negative for shortness of breath.     Cardiovascular:  Negative for chest pain.   Gastrointestinal:  Negative for nausea and vomiting.   Genitourinary:  Negative for difficulty urinating.   Musculoskeletal:  Positive for back pain.   Skin:  Negative for rash.   Allergic/Immunologic: Negative for immunocompromised state.   Neurological:  Negative for syncope and numbness.   Hematological:  Does not bruise/bleed easily.   Psychiatric/Behavioral:  Negative for suicidal ideas.       Past Medical History:   Diagnosis Date    Breast cancer     Breast cancer     bilat mast, chemo, arimidex     mda    Cancer     Cardiomyopathy due to chemotherapy     DDD (degenerative disc disease)     GERD (gastroesophageal reflux disease)     NHL (nodular histiocytic lymphoma)     Non Hodgkin's lymphoma     Non-Hodgkin lymphoma     Small cell B-cell lymphoma      Past Surgical History:   Procedure Laterality Date    BACK SURGERY      BREAST RECONSTRUCTION Bilateral     MD Lyman     BREAST RECONSTRUCTION Bilateral     MD Lyman    BREAST SURGERY       SECTION      COLONOSCOPY  2012    NARA THOMAS MD    ESOPHAGOGASTRODUODENOSCOPY  2012    NARA THOMAS MD    HYSTERECTOMY      MEDIPORT REMOVAL Right 2020    Procedure: REMOVAL, CATHETER, CENTRAL VENOUS, TUNNELED WITH PORT;  Surgeon: Jeff Amin III, MD;  [place] [facility]  Service: General;  Laterality: Right;    port-a-cath placement Right     SPINE SURGERY      TONSILLECTOMY      TOTAL ABDOMINAL HYSTERECTOMY W/ BILATERAL SALPINGOOPHORECTOMY       Family History   Problem Relation Age of Onset    Lung cancer Mother     Brain cancer Mother     Liver cancer Father     Heart failure Son     Heart disease Son      Social History     Socioeconomic History    Marital status:    Tobacco Use    Smoking status: Former     Packs/day: 1.00     Types: Cigarettes     Start date:      Quit date: 2022     Years since quittin.6    Smokeless tobacco: Never  "  Substance and Sexual Activity    Alcohol use: No    Drug use: No    Sexual activity: Not Currently         Allergies: See med card    Vitals:    11/18/22 1046   BP: 127/69   Pulse: 69   Weight: 63 kg (139 lb)   Height: 5' 1" (1.549 m)   PainSc:   6   PainLoc: Back         Physical exam:  Physical Exam  Vitals and nursing note reviewed.   Constitutional:       Appearance: Normal appearance. She is not toxic-appearing or diaphoretic.   HENT:      Head: Normocephalic and atraumatic.   Eyes:      General:         Right eye: No discharge.         Left eye: No discharge.      Extraocular Movements: Extraocular movements intact.      Conjunctiva/sclera: Conjunctivae normal.   Cardiovascular:      Rate and Rhythm: Normal rate.   Pulmonary:      Effort: Pulmonary effort is normal. No respiratory distress.      Breath sounds: Normal breath sounds.   Abdominal:      Palpations: Abdomen is soft.   Musculoskeletal:      Thoracic back: Tenderness and bony tenderness present. Decreased range of motion.   Skin:     General: Skin is warm and dry.      Findings: No rash.   Neurological:      Mental Status: She is alert and oriented to person, place, and time.   Psychiatric:         Mood and Affect: Mood and affect normal.         Cognition and Memory: Memory normal.         Judgment: Judgment normal.        UPPER EXTREMITIES: Normal alignment, normal range of motion, no atrophy, no skin changes,  hair growth and nail growth normal and equal bilaterally. No swelling, no tenderness.    LOWER EXTREMITIES:  Normal alignment, normal range of motion, no atrophy, no skin changes,  hair growth and nail growth normal and equal bilaterally. No swelling, no tenderness.    LUMBAR SPINE  Lumbar spine: ROM is full with flexion extension and oblique extension with increased pain with extension   ((+)) Facet loading   Myofascial exam: No tenderness to palpation across lumbar paraspinous muscles. Prior midline scar is well-healed.     ((--)) TTP at " the SI joint    CRANIAL NERVES:  II:  PERRL bilaterally,   III,IV,VI: EOMI.    V:  Facial sensation equal bilaterally  VII:  Facial motor function normal.  VIII:  Hearing equal to finger rub bilaterally  IX/X: Gag normal, palate symmetric  XI:  Shoulder shrug equal, head turn equal  XII:  Tongue midline without fasciculations      MOTOR: Tone and bulk: normal     MUSCLE STRENGTH:  Hip Flexion: Right 5/5, Left 5/5  Hip Extension: Right 5/5, Left 5/5  Leg Flexion: Right 5/5, Left 5/5  Leg Extension: Right 5/5, Left 5/5  Plantar Flexion: Right 5/5, Left 5/5  Dorsiflexion: Right 5/5, Left 5/5    Delt Bi Tri     Right: 5/5 5/5 5/5 5/5   Left: 5/5 5/5 5/5 5/5     SENSATION: Light touch and pinprick intact bilaterally  REFLEXES: normal, symmetric, nonbrisk.  Toes down, no clonus. Negative ivey's sign bilaterally.  GAIT: normal rise, base, steps, and arm swing.        Imaging:  MRI Lumbar Spine without contrast (9/15/2022):  Lumbar vertebral bodies are normal in height and alignment.  No acute fracture or subluxation.  No abnormal marrow edema.     Visualized distal thoracic spinal cord is normal in contour and signal intensity.  Conus medullaris terminates at T12-L1.     L1-L2: Broad-based disc bulging with facet joint hypertrophy results in mild central spinal canal stenosis with mild bilateral neural foraminal narrowing.  Narrowed AP canal diameter measures 11.6 mm.     L2-L3: Broad-based disc bulging with facet joint hypertrophy results in mild central spinal canal stenosis with mild bilateral neural foraminal narrowing.  Narrowed AP canal diameter measures 11.2 mm.     L3-L4: Broad-based disc bulging with facet joint hypertrophy and ligamentum flavum thickening results in moderate central spinal canal stenosis with moderate bilateral neural foraminal narrowing.  Narrowed AP canal diameter measures 8.2 mm.     L4-L5: Previous right hemilaminectomy.  Broad-based disc bulging with facet joint hypertrophy and  ligamentum flavum thickening results in mild central spinal canal stenosis with moderate bilateral neural foraminal narrowing.  Narrowed AP canal diameter measures 10.8 mm.     L5-S1: Broad-based disc bulging with facet joint hypertrophy results in mild central spinal canal stenosis with severe bilateral neural foraminal narrowing.  Narrowed AP canal diameter measures 10.2 mm.     Impression:     1. Multilevel degenerative disc disease from L1 through L5 resulting in mild to moderate central spinal canal stenosis with mild to moderate neural foraminal narrowing.  2. Degenerative disc disease at L5-S1 resulting in mild central spinal canal stenosis with severe neural foraminal narrowing.    X-ray thoracic spine (9/15/2022):  There is a minimal S shaped scoliosis.  There is mild loss of height involving the midthoracic vertebral bodies consistent with mild age indeterminate compression fractures.     There is mild to moderate multilevel degenerative disc disease.  There is bone demineralization.     Impression:     1. Mild S shaped scoliosis with mild to moderate multilevel degenerative disc disease.  2. Bone demineralization with mild age indeterminate compression fractures of the midthoracic spine.    Assessment: Fanta Berg is a 67 y.o. female with PMH significant for hx of breast cancer and hx of lymphoma presents as a referral for the evaluation of back pain. The patient reports that her current thoracic pain is different in character when compared to her typical chronic low back pain. X-ray of the thoracic spine significant for possible acute/subacute compression fracture. Treatment plan outlined below.     Plan:  - Ordered MRI thoracic spine without contrast for further evaluation of possible acute/subacute compression fracture  - Recommend starting celecoxib 100 mg BID as needed for pain. The patient was advised that NSAID-type medications have two very important potential side effects: gastrointestinal  irritation including hemorrhage and renal injuries. The patient was asked to take the medication with food and to cease therapy in the presence of any abnormal GI symptoms. Do not take this with any other medications in the NSAID family, such as ibuprofen, aspirin, and naproxen.  - I have stressed the importance of physical activity and a home exercise plan to help with chronic pain and improve health.  - RTC s/p imaging to discuss results and interventions as appropriate.    Thank you for referring this interesting patient, and I look forward to continuing to collaborate in her care.    Petr Maher MD  Pain Management

## 2022-12-06 ENCOUNTER — HOSPITAL ENCOUNTER (OUTPATIENT)
Dept: RADIOLOGY | Facility: HOSPITAL | Age: 67
Discharge: HOME | End: 2022-12-06
Attending: ANESTHESIOLOGY
Payer: MEDICARE

## 2022-12-06 DIAGNOSIS — S22.009D CLOSED FRACTURE OF THORACIC VERTEBRA WITH ROUTINE HEALING, UNSPECIFIED FRACTURE MORPHOLOGY, UNSPECIFIED THORACIC VERTEBRAL LEVEL, SUBSEQUENT ENCOUNTER: ICD-10-CM

## 2022-12-06 PROCEDURE — 72146 MRI CHEST SPINE W/O DYE: CPT | Mod: TC,PO

## 2022-12-13 ENCOUNTER — TELEPHONE (OUTPATIENT)
Dept: PAIN MEDICINE | Facility: CLINIC | Age: 67
End: 2022-12-13
Payer: MEDICARE

## 2022-12-13 NOTE — TELEPHONE ENCOUNTER
----- Message from Heidi Escalante NP sent at 12/12/2022 10:59 AM CST -----  Regarding: MRI F/U  Please contact the patient and offer her a follow up to discuss MRI findings and interventional recommendations.   Thank you,  Heidi Escalante NP   ----- Message -----  From: Petr Maher MD  Sent: 12/10/2022  12:37 AM CST  To: Heidi Escalante NP, Fabi Sosa LPN    Can you do me a favor Heidi? Can you clarify with a radiologist whether any acute/subacute compression fractures are present on the patient's most recent MRI? If not, please schedule her for a follow-up with you and then you can discuss possible JOCELYN vs MBB as you feel is appropriate. Thank you.

## 2022-12-13 NOTE — TELEPHONE ENCOUNTER
----- Message from Ashley Muñoz, Patient Care Assistant sent at 12/13/2022  2:18 PM CST -----  Regarding: advice  Contact: pt  Type: Needs Medical Advice  Who Called:  pt   Best Call Back Number: [phone]    Additional Information: pt states she would like a callback regarding an problem/concern. Please call pt to advise. Thanks!

## 2022-12-13 NOTE — TELEPHONE ENCOUNTER
----- Message from Sergio Witt sent at 12/13/2022  3:29 PM CST -----  Regarding: appt needed  Type:  Patient Returning Call    Who Called:  Fanta    Who Left Message for Patient:  Fabi    Does the patient know what this is regarding?: yes    Best Call Back Number:  [phone]    Additional Information:  pt scheduled f/u appt w/Dr Maher for DDD f/u MRI results. Pt wants to know if needs to keep appt or if can just get call to discuss results.

## 2022-12-15 ENCOUNTER — OFFICE VISIT (OUTPATIENT)
Dept: PAIN MEDICINE | Facility: CLINIC | Age: 67
End: 2022-12-15
Payer: MEDICARE

## 2022-12-15 DIAGNOSIS — M51.34 DDD (DEGENERATIVE DISC DISEASE), THORACIC: Primary | ICD-10-CM

## 2022-12-15 DIAGNOSIS — Z87.81 HISTORY OF COMPRESSION FRACTURE OF SPINE: ICD-10-CM

## 2022-12-15 DIAGNOSIS — M47.894 OTHER SPONDYLOSIS, THORACIC REGION: ICD-10-CM

## 2022-12-15 PROCEDURE — 99213 OFFICE O/P EST LOW 20 MIN: CPT | Mod: PBBFAC,PO | Performed by: ANESTHESIOLOGY

## 2022-12-15 PROCEDURE — 99214 PR OFFICE/OUTPT VISIT, EST, LEVL IV, 30-39 MIN: ICD-10-PCS | Mod: S$PBB,,, | Performed by: ANESTHESIOLOGY

## 2022-12-15 PROCEDURE — 99999 PR PBB SHADOW E&M-EST. PATIENT-LVL III: ICD-10-PCS | Mod: PBBFAC,,, | Performed by: ANESTHESIOLOGY

## 2022-12-15 PROCEDURE — 99214 OFFICE O/P EST MOD 30 MIN: CPT | Mod: S$PBB,,, | Performed by: ANESTHESIOLOGY

## 2022-12-15 PROCEDURE — 99999 PR PBB SHADOW E&M-EST. PATIENT-LVL III: CPT | Mod: PBBFAC,,, | Performed by: ANESTHESIOLOGY

## 2022-12-15 RX ORDER — IBUPROFEN 800 MG/1
800 TABLET ORAL 3 TIMES DAILY
COMMUNITY
Start: 2022-12-12 | End: 2023-03-06 | Stop reason: SDUPTHER

## 2022-12-15 NOTE — PROGRESS NOTES
FOLLOW UP NOTE:     CHIEF COMPLAINT: back pain    INITIAL HISTORY OF PRESENT ILLNESS: Fanta Berg is a 67 y.o. female with PMH significant for hx of breast cancer and hx of lymphoma presents as a referral for the evaluation of back pain. The patient reports that her pain began approximately since 2005 which she attributes to chemotherapy. The patient reports of acute worsening of her back pain ~ 6 months ago after no inciting incident or trauma. She attributes this to doing more physically. The patient localizes her pain to the area across her thoracic spine at the level of her braline. The patient reports of radiation to her ribs. The patient describes her pain as an aching and burning type of pain. The patient denies of numbness. The patient reports that her pain is a 6/10. Patient denies of any urinary/fecal incontinence, saddle anesthesia, or weakness.      Aggravating factors: physical activity in general     Mitigating factors: rest     Relevant Surgeries: yes; hx of lumbar spine surgery in [place]; ~1990s.     INTERVAL HISTORY OF PRESENT ILLNESS: Fanta Berg is a 67 y.o. female with PMH significant for hx of breast cancer and hx of lymphoma presents as an established patient for the continued management of back pain. Today, the patient localizes her worst pain to her back in between her shoulder blades. The patient reports of radiation to her rib cage. She denies of associated numbness. The patient reports that her pain is worsened with working. The patient reports that her pain is improved with rest and laying down. The patient reports that her current pain is a 7/10 while she is working. The patient denies of any significant changes in her health since her last appointment. The patient also denies of any changes in the character of her pain since her last appointment. Patient denies of any urinary/fecal incontinence, saddle anesthesia, or weakness.     INTERVENTIONAL PAIN HISTORY:    CURRENT  PAIN MEDICATIONS:   Currently taking: Voltaren gel, celecoxib 100 mg BID      Has tried in the past:    Opioids: yes; previously with [facility] until 2019 when she discontinued her opioids as she was feeling better. Previously took Loratab  NSAIDS: yes; ibuprofen 800 mg - GI symptoms   Tylenol: yes  Muscle relaxants: no  TCAs: no  SNRIs: no  Anticonvulsants: yes; gabapentin in the past with side effects  topical creams: yes    ROS:  Review of Systems   Constitutional:  Negative for chills and fever.   HENT:  Negative for sore throat.    Eyes:  Negative for visual disturbance.   Respiratory:  Negative for shortness of breath.    Cardiovascular:  Negative for chest pain.   Gastrointestinal:  Negative for nausea and vomiting.   Genitourinary:  Negative for difficulty urinating.   Musculoskeletal:  Positive for back pain.   Skin:  Negative for rash.   Allergic/Immunologic: Negative for immunocompromised state.   Neurological:  Negative for syncope.   Hematological:  Does not bruise/bleed easily.   Psychiatric/Behavioral:  Negative for suicidal ideas.       MEDICAL, SURGICAL, FAMILY, SOCIAL HX: reviewed    MEDICATIONS/ALLERGIES: reviewed    PHYSICAL EXAM:    VITALS: Vitals reviewed.     Physical Exam  Vitals and nursing note reviewed.   Constitutional:       Appearance: Normal appearance. She is not toxic-appearing or diaphoretic.   HENT:      Head: Normocephalic and atraumatic.   Eyes:      General:         Right eye: No discharge.         Left eye: No discharge.      Extraocular Movements: Extraocular movements intact.      Conjunctiva/sclera: Conjunctivae normal.   Cardiovascular:      Rate and Rhythm: Normal rate.   Pulmonary:      Effort: Pulmonary effort is normal. No respiratory distress.      Breath sounds: Normal breath sounds.   Abdominal:      Palpations: Abdomen is soft.   Musculoskeletal:      Thoracic back: Tenderness and bony tenderness present. No swelling. Decreased range of motion.    Skin:     General: Skin is warm and dry.      Findings: No rash.   Neurological:      Mental Status: She is alert and oriented to person, place, and time.   Psychiatric:         Mood and Affect: Mood and affect normal.         Cognition and Memory: Memory normal.         Judgment: Judgment normal.      UPPER EXTREMITIES: Normal alignment, normal range of motion, no atrophy, no skin changes,  hair growth and nail growth normal and equal bilaterally. No swelling, no tenderness.    LOWER EXTREMITIES:  Normal alignment, normal range of motion, no atrophy, no skin changes,  hair growth and nail growth normal and equal bilaterally. No swelling, no tenderness.     LUMBAR SPINE  Lumbar spine: ROM is full with flexion extension and oblique extension with increased pain with extension   ((+)) Facet loading   Myofascial exam: No tenderness to palpation across lumbar paraspinous muscles. Prior midline scar is well-healed.     MOTOR: Tone and bulk: normal      MUSCLE STRENGTH:  Hip Flexion: Right 5/5, Left 5/5  Hip Extension: Right 5/5, Left 5/5  Leg Flexion: Right 5/5, Left 5/5  Leg Extension: Right 5/5, Left 5/5  Plantar Flexion: Right 5/5, Left 5/5  Dorsiflexion: Right 5/5, Left 5/5     Delt      Bi         Tri                         Right:   5/5       5/5       5/5       5/5         Left:     5/5       5/5       5/5       5/5            SENSATION: Light touch and pinprick intact bilaterally  REFLEXES: normal, symmetric, nonbrisk.  Toes down, no clonus. Negative ivey's sign bilaterally.  GAIT: normal rise, base, steps, and arm swing.      IMAGING: MRI Thoracic spine without contrast (12/6/2022):  FINDINGS: On the current inspection, marginal dextroconvex alignment of the lumbar spine, with appropriate stature and contour of all the vertebral bodies included throughout the thoracic spine at all respective levels. Mild diminished T2 signal is established throughout entire the thoracic spine secondary to ongoing  desiccation with the most pronounced changes in the central thoracic spine and T5/T6 and T6/T7. There is evidence of fairly prominent cartilaginous nodes projecting along the lower margin of T6 and upper margin of T8 on the sagittal inspection.     As identified on the uppermost field of view, there is evidence of a prominent projecting from the dorsal edge of the C6/C7 and C7/T1 intervertebral disc levels with evidence of minimal contact upon the anterior surface of the cervical cord.     Chronic anterior subluxation stable and C7 upon T1 in the forward direction. Chronic anterior subluxation of T2 upon T3 noted likewise and unchanged.     There are fibrofatty changes involving the opposing endplates established across the T5/T6, T6/T7 and T8/T9 intervertebral disc level, multilevel desiccation, and shallow broad-based disc bulges at levels of T6/T7, T7/T8, and T8/T9 minimal cord contact. No significant neural compression is established.     There is no evidence of cord compression. Cord maintains normal caliber and overall signal intensity.     The included images of the upper lumbar spine demonstrate evidence of shallow disc bulge propagated dorsally from the L1/L2 and L2/L3 with mild anterior thecal sac effacement, no significant cord compression or high-grade spinal canal stenosis.     IMPRESSION:  1. Unchanged comparison examination stable changes and multilevel degenerative disc disease and moderate facet arthrosis most pronounced in the mid to lower thoracic spine.  2. Given the patient's clinical history breast carcinoma, there is no convincing findings related to metastatic disease thoracic spine on the current inspection.    ASSESSMENT: Fanta Berg is a 67 y.o. female with PMH significant for hx of breast cancer and hx of lymphoma presents as an established patient for the continued management of back pain. Today, the patient localizes her worst pain to her back in between her shoulder blades. MRI of  the thoracic spine reviewed with the patient personally. MRI significant for multi-level DDD and facet arthropathy but negative for any acute/subacute compression fractures. I suspect the aforementioned pathologies are contributing to her current pain to some degree. Treatment plan outlined below.     PLAN:  Advised the patient to start celebrex as previously prescribed for pain and inflammation  Plan to offer TESI in the future if pain fails to improve  I have stressed the importance of physical activity and a home exercise plan to help with chronic pain and improve health.  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

## 2023-01-09 ENCOUNTER — OFFICE VISIT (OUTPATIENT)
Dept: HEMATOLOGY/ONCOLOGY | Facility: CLINIC | Age: 68
End: 2023-01-09
Payer: MEDICARE

## 2023-01-09 VITALS
WEIGHT: 142 LBS | HEART RATE: 65 BPM | SYSTOLIC BLOOD PRESSURE: 156 MMHG | HEIGHT: 61 IN | BODY MASS INDEX: 26.81 KG/M2 | RESPIRATION RATE: 18 BRPM | TEMPERATURE: 98 F | DIASTOLIC BLOOD PRESSURE: 83 MMHG

## 2023-01-09 DIAGNOSIS — C83.09 SMALL B-CELL LYMPHOMA OF EXTRANODAL SITE EXCLUDING SPLEEN AND OTHER SOLID ORGANS: ICD-10-CM

## 2023-01-09 DIAGNOSIS — Z85.3 HISTORY OF BREAST CANCER: Primary | ICD-10-CM

## 2023-01-09 PROCEDURE — 99214 PR OFFICE/OUTPT VISIT, EST, LEVL IV, 30-39 MIN: ICD-10-PCS | Mod: S$GLB,,, | Performed by: INTERNAL MEDICINE

## 2023-01-09 PROCEDURE — 99214 OFFICE O/P EST MOD 30 MIN: CPT | Mod: S$GLB,,, | Performed by: INTERNAL MEDICINE

## 2023-01-10 NOTE — PROGRESS NOTES
[facility] hematology Oncology in office subsequent encounter note  23    Subjective:      Patient ID:   Fanta Berg  67 y.o. female  1955  [place]      Chief Complaint:  History of non-Hodgkin's lymphoma   Hx of bilateral breast cancer.  Bilateral mastectomy with reconstruction.  Adjuvant tamoxifen x's 5 years.    She has no complaints today, says she is doing well overall.  She is following isolation precautions at home because of the pan epidemic.  Discussed with her the COVID-19 vaccine.  She does not feel that she will be getting the vaccine, she does not feel that she needs it.    She has GERD,  she is on Celebrex and Nexium.  LBP sx.  MRI with DDD, DJD.  She was recently evaluated for GB sx.    MGUS . Followed at [facility].    Hx small cell NHL, treated with -R x's 6, then Rituxan maintenance.  Had neuro? Sx after Rituxan 2015, Rituxan D/C ed. Low grade NHL.  Follicular vs marginal NHL. .    Chronic LBP sx 2nd DDD. S/P epidural injections.  Chronic neck pains, hx C spine fx.  On PT.    Hx HTN, rheumatoid fever as a child, GERD, arthritis sx, DDD.    Ovarian tumor removed at [facility] 2005.  R MRM 2005  L mastectomy   T & A  Back surgery   C section x's 3  Breast implants L & R 2006  Port removed 2012  Complete hysterectomy [facility]  M0    Allergy benadryl, codiene, levaquin    Smoke rarely, ETOH no, Job , disabled    Mom small cell lung cancer  Dad pancreatic cancer  Sister HTN  Son AMI  Daughter schizophrenia  Daughter panic attacks  Mom, sisters, daughters no breast cancer    ROS:   GEN: normal without any fever, night sweats or weight loss  HEENT:See HPI.   sore throat, stiff neck, changes in vision  CV: RF as a child,     no CP, SOB, PND, MACHADO or orthopnea  PULM: normal with no SOB, cough, hemoptysis, sputum or pleuritic pain  GI: GERD+.   no abdominal pain, nausea, vomiting, constipation, diarrhea, melanotic stools, BRBPR, or  "hematemesis  : normal with no hematuria, dysuria  BREAST: See HPI  SKIN: normal with no rash, erythema, bruising, or swelling     Past Medical History:   Diagnosis Date    Breast cancer     Breast cancer     bilat mast, chemo, arimidex     mda    Cancer     Cardiomyopathy due to chemotherapy     DDD (degenerative disc disease)     GERD (gastroesophageal reflux disease)     NHL (nodular histiocytic lymphoma)     Non Hodgkin's lymphoma     Non-Hodgkin lymphoma     Small cell B-cell lymphoma      Past Surgical History:   Procedure Laterality Date    BACK SURGERY      BREAST RECONSTRUCTION Bilateral 2006    MD Lyman     BREAST RECONSTRUCTION Bilateral 2006    MD Nura    BREAST SURGERY       SECTION      COLONOSCOPY  2012    NARA THOMAS MD    ESOPHAGOGASTRODUODENOSCOPY  2012    NARA THOMAS MD    HYSTERECTOMY      MEDIPORT REMOVAL Right 2020    Procedure: REMOVAL, CATHETER, CENTRAL VENOUS, TUNNELED WITH PORT;  Surgeon: Jeff Amin III, MD;  Location: [facility];  Service: General;  Laterality: Right;    port-a-cath placement Right     SPINE SURGERY      TONSILLECTOMY      TOTAL ABDOMINAL HYSTERECTOMY W/ BILATERAL SALPINGOOPHORECTOMY         Review of patient's allergies indicates:   Allergen Reactions    Benadryl [diphenhydramine hcl] Anaphylaxis    Levaquin [levofloxacin] Other (See Comments)     Permanent tendon & ligament damage    Ibuprofen-diphenhydramine cit Nausea Only      "Any meds with PM behind them "    Sulfamethoxazole-trimethoprim Nausea And Vomiting    Tolmetin Nausea Only    Codeine Palpitations and Rash    Duloxetine hcl Other (See Comments)     "increased sweating"         Current Outpatient Medications:     acyclovir 5% (ZOVIRAX) 5 % ointment, Apply topically 5 (five) times daily., Disp: 30 g, Rfl: 1    ascorbic acid/multivit-min (VITAMIN C FIZZY DRINK ORAL), Take 1,000 mg by mouth once daily., Disp: , Rfl:     celecoxib (CELEBREX) 100 MG capsule, " "Take 1 capsule (100 mg total) by mouth 2 (two) times daily., Disp: 60 capsule, Rfl: 2    cholecalciferol, vitamin D3, 125 mcg (5,000 unit) Tab, Take 5,000 Units by mouth once daily., Disp: , Rfl:     ciclopirox (PENLAC) 8 % Soln, Apply topically nightly., Disp: 6.6 mL, Rfl: 1    esomeprazole (NEXIUM) 40 MG capsule, Take once daily with celebrex as needed, Disp: 90 capsule, Rfl: 1    fluticasone propionate (FLONASE) 50 mcg/actuation nasal spray, SQUIRT 2 SPRAYS IN EACH NOSTRIL EVERY DAY AS DIRECTED, Disp: 16 g, Rfl: 10    ibuprofen (ADVIL,MOTRIN) 800 MG tablet, Take 800 mg by mouth 3 (three) times daily., Disp: , Rfl:     mupirocin (BACTROBAN) 2 % ointment, APPLY TO AFFECTED AREA TOPICALLY ONCE DAILY AS DIRECTED, Disp: 22 g, Rfl: 1    silver sulfADIAZINE 1% (SILVADENE) 1 % cream, Apply topically 2 (two) times daily., Disp: 50 g, Rfl: 0    simethicone (MYLICON) 125 mg Cap capsule, Take 125 mg by mouth daily as needed for Flatulence., Disp: , Rfl:     sucralfate (CARAFATE) 1 gram tablet, Take 1 tablet (1 g total) by mouth 4 (four) times daily before meals and nightly., Disp: 56 tablet, Rfl: 0      Objective:   Vitals:  Blood pressure (!) 156/83, pulse 65, temperature 98 °F (36.7 °C), resp. rate 18, height 5' 1" (1.549 m), weight 64.4 kg (142 lb).    Physical Examination:   GEN: no apparent distress, comfortable  HEAD: atraumatic and normocephalic  EYES: no pallor, no icterus  ENT: no pharyngeal erythema, external ears WNL; no nasal discharge; no thrush  NECK: no masses, thyroid normal, trachea midline, no LAD/LN's, supple  CV: RRR with no murmur; normal pulse; normal S1 and S2; no pedal edema  CHEST: Normal respiratory effort; CTAB; normal breath sounds; no wheeze or crackles  ABDOM: nontender and nondistended; soft;  no rebound/guarding  MUSC/Skeletal: ROM normal; no crepitus; joints normal  EXTREM: no clubbing, cyanosis, inflammation or swelling  SKIN: no rashes, lesions, ulcers, petechiae  : no CVAT  NEURO: " "grossly intact; motor/sensory WNL;  no tremors  PSYCH: normal mood, affect and behavior  LYMPH: normal cervical, supraclavicular, axillary and groin LN's  "BREASTS": no palpable mass.  I do not palpate fullness or tenderness on examination of the left or right chest.  She has extensive postop change with bilateral implants in place.      Labs:   Lab Results   Component Value Date    WBC 6.2 09/07/2022    HGB 13.7 09/07/2022    HCT 39.7 09/07/2022    MCV 89.6 09/07/2022     09/07/2022    CMP  Sodium   Date Value Ref Range Status   09/07/2022 137 135 - 146 mmol/L Final   10/09/2017 137 134 - 144 mmol/L      Potassium   Date Value Ref Range Status   09/07/2022 4.0 3.5 - 5.3 mmol/L Final     Chloride   Date Value Ref Range Status   09/07/2022 101 98 - 110 mmol/L Final   10/09/2017 100 98 - 110 mmol/L      CO2   Date Value Ref Range Status   09/07/2022 29 20 - 32 mmol/L Final     Glucose   Date Value Ref Range Status   09/07/2022 81 65 - 139 mg/dL Final     Comment:               Non-fasting reference interval        10/09/2017 87 70 - 99 mg/dL      BUN   Date Value Ref Range Status   09/07/2022 15 7 - 25 mg/dL Final     Creatinine   Date Value Ref Range Status   09/07/2022 0.84 0.50 - 1.05 mg/dL Final   10/09/2017 0.63 0.60 - 1.40 mg/dL      Calcium   Date Value Ref Range Status   09/07/2022 10.0 8.6 - 10.4 mg/dL Final     Total Protein   Date Value Ref Range Status   09/07/2022 6.9 6.1 - 8.1 g/dL Final     Albumin   Date Value Ref Range Status   09/07/2022 4.7 3.6 - 5.1 g/dL Final   10/09/2017 4.3 3.1 - 4.7 g/dL      Total Bilirubin   Date Value Ref Range Status   09/07/2022 0.5 0.2 - 1.2 mg/dL Final     Alkaline Phosphatase   Date Value Ref Range Status   04/16/2020 79 37 - 153 U/L Final     AST   Date Value Ref Range Status   09/07/2022 18 10 - 35 U/L Final     ALT   Date Value Ref Range Status   09/07/2022 17 6 - 29 U/L Final     Anion Gap   Date Value Ref Range Status   02/06/2020 11 8 - 16 mmol/L Final "   02/06/2020 11 8 - 16 mmol/L Final     eGFR if    Date Value Ref Range Status   03/08/2022 104 > OR = 60 mL/min/1.73m2 Final     eGFR if non    Date Value Ref Range Status   03/08/2022 90 > OR = 60 mL/min/1.73m2 Final       Radiology/Diagnostic Studies:    PET scan July 2020 does not show evidence for recurrence or metastatic cancer, pulmonary nodule is stable since 2013.  Marta catheter was removed in February 2020.    Assessment:   (1) 67 y.o. female with diagnosis of  Bilateral breast cancer 2005, low grade NHL 2013.    No evidence for recurrent breast cancer or NHL, on last Pet 7/2020.    RML pulmonary nodule, stable since 2013.    Observe for now.  Return to clinic in 12 month.

## 2023-06-26 ENCOUNTER — HOSPITAL ENCOUNTER (OUTPATIENT)
Dept: RADIOLOGY | Facility: HOSPITAL | Age: 68
Discharge: HOME | End: 2023-06-26
Attending: NURSE PRACTITIONER
Payer: MEDICARE

## 2023-06-26 DIAGNOSIS — M25.552 LEFT HIP PAIN: ICD-10-CM

## 2023-07-10 ENCOUNTER — HOSPITAL ENCOUNTER (OUTPATIENT)
Dept: RADIOLOGY | Facility: HOSPITAL | Age: 68
Discharge: HOME | End: 2023-07-10
Attending: NURSE PRACTITIONER
Payer: MEDICARE

## 2023-07-10 DIAGNOSIS — M25.511 ACUTE PAIN OF RIGHT SHOULDER: ICD-10-CM

## 2023-07-10 PROCEDURE — 73030 XR SHOULDER TRAUMA 3 VIEW RIGHT: ICD-10-PCS | Mod: 26,RT,, | Performed by: RADIOLOGY

## 2023-07-10 PROCEDURE — 73030 X-RAY EXAM OF SHOULDER: CPT | Mod: 26,RT,, | Performed by: RADIOLOGY

## 2023-07-10 PROCEDURE — 73030 X-RAY EXAM OF SHOULDER: CPT | Mod: TC,RT

## 2024-03-21 ENCOUNTER — HOSPITAL ENCOUNTER (OUTPATIENT)
Dept: RADIOLOGY | Facility: HOSPITAL | Age: 69
Discharge: HOME | End: 2024-03-21
Attending: NURSE PRACTITIONER
Payer: MEDICARE

## 2024-03-21 DIAGNOSIS — Z13.820 SCREENING FOR OSTEOPOROSIS: ICD-10-CM

## 2024-03-21 DIAGNOSIS — Z78.0 POST-MENOPAUSAL: ICD-10-CM

## 2024-03-21 PROCEDURE — 77080 DXA BONE DENSITY AXIAL: CPT | Mod: TC

## 2024-03-21 PROCEDURE — 77080 DXA BONE DENSITY AXIAL: CPT | Mod: 26,,, | Performed by: RADIOLOGY

## 2024-03-26 ENCOUNTER — OFFICE VISIT (OUTPATIENT)
Dept: HEMATOLOGY/ONCOLOGY | Facility: CLINIC | Age: 69
End: 2024-03-26
Payer: MEDICARE

## 2024-03-26 VITALS
BODY MASS INDEX: 26.96 KG/M2 | RESPIRATION RATE: 18 BRPM | HEIGHT: 61 IN | WEIGHT: 142.81 LBS | SYSTOLIC BLOOD PRESSURE: 148 MMHG | DIASTOLIC BLOOD PRESSURE: 82 MMHG | HEART RATE: 62 BPM | TEMPERATURE: 97 F

## 2024-03-26 DIAGNOSIS — Z17.0 MALIGNANT NEOPLASM OF CENTRAL PORTION OF RIGHT BREAST IN FEMALE, ESTROGEN RECEPTOR POSITIVE: ICD-10-CM

## 2024-03-26 DIAGNOSIS — C50.111 MALIGNANT NEOPLASM OF CENTRAL PORTION OF RIGHT BREAST IN FEMALE, ESTROGEN RECEPTOR POSITIVE: ICD-10-CM

## 2024-03-26 DIAGNOSIS — M51.37 DDD (DEGENERATIVE DISC DISEASE), LUMBOSACRAL: ICD-10-CM

## 2024-03-26 DIAGNOSIS — C83.09 SMALL B-CELL LYMPHOMA OF EXTRANODAL SITE EXCLUDING SPLEEN AND OTHER SOLID ORGANS: Primary | ICD-10-CM

## 2024-03-26 PROCEDURE — 99214 OFFICE O/P EST MOD 30 MIN: CPT | Mod: S$GLB,,, | Performed by: INTERNAL MEDICINE

## 2024-03-26 RX ORDER — DEXTROMETHORPHAN HYDROBROMIDE, GUAIFENESIN 5; 100 MG/5ML; MG/5ML
650 LIQUID ORAL EVERY 8 HOURS
COMMUNITY

## 2024-03-26 NOTE — PROGRESS NOTES
[facility] hematology Oncology in office subsequent encounter note  3/26/24    Subjective:      Patient ID:   Fanta Berg  68 y.o. female  1955  [place]      Chief Complaint:  History of non-Hodgkin's lymphoma.     Hx of bilateral breast cancer.    Bilateral mastectomy with reconstruction.  Adjuvant tamoxifen x's 5 years.    She c/o LBP, L hip pain, shoulder pain.  Check Bone Scan.  She has DJD and osteopenia.    She has GERD,  she is on Celebrex and Nexium.  LBP sx.  MRI with DDD, DJD.  She was recently evaluated for GB sx.    MGUS . Followed at [facility].    Hx small cell NHL, treated with -R x's 6, then Rituxan maintenance.  Had neuro? Sx after Rituxan 2015, Rituxan D/C ed. Low grade NHL.  Follicular vs marginal NHL. .    Chronic LBP sx 2nd DDD. S/P epidural injections.  Chronic neck pains, hx C spine fx.  On PT.    Hx HTN, rheumatoid fever as a child, GERD, arthritis sx, DDD.    Ovarian tumor removed at [facility] 2005.  R MRM 2005  L mastectomy   T & A  Back surgery   C section x's 3  Breast implants L & R 2006  Port removed 2012  Complete hysterectomy [facility]  M0    Allergy benadryl, codiene, levaquin    Smoke rarely, ETOH no, Job , disabled    Mom small cell lung cancer  Dad pancreatic cancer  Sister HTN  Son AMI  Daughter schizophrenia  Daughter panic attacks  Mom, sisters, daughters no breast cancer    ROS:   GEN: normal without any fever, night sweats or weight loss  HEENT:See HPI.   sore throat, stiff neck, changes in vision  CV: RF as a child,     no CP, SOB, PND, MACHADO or orthopnea  PULM: normal with no SOB, cough, hemoptysis, sputum or pleuritic pain  GI: GERD+.   no abdominal pain, nausea, vomiting, constipation, diarrhea, melanotic stools, BRBPR, or hematemesis  : normal with no hematuria, dysuria  BREAST: See HPI  SKIN: normal with no rash, erythema, bruising, or swelling     Past Medical History:   Diagnosis Date    Breast  "cancer     Breast cancer     bilat mast, chemo, arimidex     mda    Cancer     Cardiomyopathy due to chemotherapy     DDD (degenerative disc disease)     GERD (gastroesophageal reflux disease)     NHL (nodular histiocytic lymphoma)     Non Hodgkin's lymphoma     Non-Hodgkin lymphoma     Small cell B-cell lymphoma      Past Surgical History:   Procedure Laterality Date    BACK SURGERY      BREAST RECONSTRUCTION Bilateral 2006    [facility]     BREAST RECONSTRUCTION Bilateral 2006    MD Nura    BREAST SURGERY       SECTION      COLONOSCOPY  2012    NARA THOMAS MD    ESOPHAGOGASTRODUODENOSCOPY  2012    NARA THOMAS MD    HYSTERECTOMY      MEDIPORT REMOVAL Right 2020    Procedure: REMOVAL, CATHETER, CENTRAL VENOUS, TUNNELED WITH PORT;  Surgeon: Jeff Amin III, MD;  Location: [facility] OR;  Service: General;  Laterality: Right;    port-a-cath placement Right     SPINE SURGERY      TONSILLECTOMY      TOTAL ABDOMINAL HYSTERECTOMY W/ BILATERAL SALPINGOOPHORECTOMY         Review of patient's allergies indicates:   Allergen Reactions    Benadryl [diphenhydramine hcl] Anaphylaxis    Levaquin [levofloxacin] Other (See Comments)     Permanent tendon & ligament damage    Ibuprofen-diphenhydramine cit Nausea Only      "Any meds with PM behind them "    Sulfamethoxazole-trimethoprim Nausea And Vomiting    Tolmetin Nausea Only    Codeine Palpitations and Rash    Duloxetine hcl Other (See Comments)     "increased sweating"         Current Outpatient Medications:     acetaminophen (TYLENOL) 650 MG TbSR, Take 650 mg by mouth every 8 (eight) hours., Disp: , Rfl:     acyclovir 5% (ZOVIRAX) 5 % ointment, Apply topically 5 (five) times daily., Disp: 30 g, Rfl: 1    ARTHRITIS PAIN, DICLOFENAC, 1 % Gel, APPLY 2 GRAMS TOPICALLY TWICE DAILY AS NEEDED, Disp: 100 g, Rfl: 0    ascorbic acid/multivit-min (VITAMIN C FIZZY DRINK ORAL), Take 1,000 mg by mouth once daily., Disp: , Rfl:     " "cholecalciferol, vitamin D3, 125 mcg (5,000 unit) Tab, Take 5,000 Units by mouth once daily., Disp: , Rfl:     ciclopirox (PENLAC) 8 % Soln, Apply topically nightly., Disp: 6.6 mL, Rfl: 1    esomeprazole (NEXIUM) 40 MG capsule, Take once daily with celebrex as needed, Disp: 90 capsule, Rfl: 2    fluticasone propionate (FLONASE) 50 mcg/actuation nasal spray, SQUIRT 2 SPRAYS IN EACH NOSTRIL EVERY DAY AS DIRECTED, Disp: 16 g, Rfl: 10    ibuprofen (ADVIL,MOTRIN) 600 MG tablet, Take 1 tablet (600 mg total) by mouth 2 (two) times daily as needed for Pain., Disp: 60 tablet, Rfl: 3    mupirocin (BACTROBAN) 2 % ointment, APPLY TO AFFECTED AREA TOPICALLY ONCE DAILY AS DIRECTED, Disp: 22 g, Rfl: 1    simethicone (MYLICON) 125 mg Cap capsule, Take 1 capsule (125 mg total) by mouth daily as needed for Flatulence., Disp: 30 capsule, Rfl: 1      Objective:   Vitals:  Blood pressure (!) 148/82, pulse 62, temperature 97.1 °F (36.2 °C), resp. rate 18, height 5' 1" (1.549 m), weight 64.8 kg (142 lb 12.8 oz).    Physical Examination:   GEN: no apparent distress, comfortable  HEAD: atraumatic and normocephalic  EYES: no pallor, no icterus  ENT: no pharyngeal erythema, external ears WNL; no nasal discharge; no thrush  NECK: no masses, thyroid normal, trachea midline, no LAD/LN's, supple  CV: RRR with no murmur; normal pulse; normal S1 and S2; no pedal edema  CHEST: Normal respiratory effort; CTAB; normal breath sounds; no wheeze or crackles  ABDOM: nontender and nondistended; soft;  no rebound/guarding  MUSC/Skeletal: ROM normal; no crepitus; joints normal  EXTREM: no clubbing, cyanosis, inflammation or swelling  SKIN: no rashes, lesions, ulcers, petechiae  : no CVAT  NEURO: grossly intact; motor/sensory WNL;  no tremors  PSYCH: normal mood, affect and behavior  LYMPH: normal cervical, supraclavicular, axillary and groin LN's  "BREASTS": no palpable mass.  I do not palpate fullness or tenderness on examination of the left or right " chest.  She has extensive postop change with bilateral implants in place.      Labs:   Lab Results   Component Value Date    WBC 10.1 01/19/2024    HGB 13.3 01/19/2024    HCT 39.1 01/19/2024    MCV 90.1 01/19/2024     01/19/2024    CMP  Sodium   Date Value Ref Range Status   01/19/2024 140 135 - 146 mmol/L Final   10/09/2017 137 134 - 144 mmol/L      Potassium   Date Value Ref Range Status   01/19/2024 4.7 3.5 - 5.3 mmol/L Final     Chloride   Date Value Ref Range Status   01/19/2024 103 98 - 110 mmol/L Final   10/09/2017 100 98 - 110 mmol/L      CO2   Date Value Ref Range Status   01/19/2024 27 20 - 32 mmol/L Final     Glucose   Date Value Ref Range Status   01/19/2024 86 65 - 139 mg/dL Final     Comment:               Non-fasting reference interval        10/09/2017 87 70 - 99 mg/dL      BUN   Date Value Ref Range Status   01/19/2024 16 7 - 25 mg/dL Final     Creatinine   Date Value Ref Range Status   01/19/2024 0.77 0.60 - 1.00 mg/dL Final   10/09/2017 0.63 0.60 - 1.40 mg/dL      Calcium   Date Value Ref Range Status   01/19/2024 9.9 8.6 - 10.4 mg/dL Final     Total Protein   Date Value Ref Range Status   01/19/2024 7.1 6.1 - 8.1 g/dL Final     Albumin   Date Value Ref Range Status   01/19/2024 4.4 3.6 - 5.1 g/dL Final   10/09/2017 4.3 3.1 - 4.7 g/dL      Total Bilirubin   Date Value Ref Range Status   01/19/2024 0.6 0.2 - 1.2 mg/dL Final     Alkaline Phosphatase   Date Value Ref Range Status   04/16/2020 79 37 - 153 U/L Final     AST   Date Value Ref Range Status   01/19/2024 16 10 - 35 U/L Final     ALT   Date Value Ref Range Status   01/19/2024 13 6 - 29 U/L Final     Anion Gap   Date Value Ref Range Status   02/06/2020 11 8 - 16 mmol/L Final   02/06/2020 11 8 - 16 mmol/L Final     eGFR if    Date Value Ref Range Status   03/08/2022 104 > OR = 60 mL/min/1.73m2 Final     eGFR if non    Date Value Ref Range Status   03/08/2022 90 > OR = 60 mL/min/1.73m2 Final        Radiology/Diagnostic Studies:    PET scan July 2020 does not show evidence for recurrence or metastatic cancer, pulmonary nodule is stable since 2013.  Marta catheter was removed in February 2020.    Assessment:   (1) 68 y.o. female with diagnosis of  Bilateral breast cancer 2005, low grade NHL 2013.    No evidence for recurrent breast cancer or NHL, on last Pet 7/2020.    RML pulmonary nodule, stable since 2013.    (2)Joint, bone pain, check bone scan, RTC 3 weeks.

## 2024-04-01 ENCOUNTER — TELEPHONE (OUTPATIENT)
Dept: HEMATOLOGY/ONCOLOGY | Facility: CLINIC | Age: 69
End: 2024-04-01

## 2024-04-01 NOTE — TELEPHONE ENCOUNTER
Spoke with patient and advised her that I will talk with Dr. Noonan and get back with her about the bone scan. She said if it could be scheduled in [facility] she would like to have it there. Advised would call back. Pt verbalized understanding.

## 2024-04-03 ENCOUNTER — TELEPHONE (OUTPATIENT)
Dept: HEMATOLOGY/ONCOLOGY | Facility: CLINIC | Age: 69
End: 2024-04-03

## 2024-04-03 DIAGNOSIS — M25.50 ARTHRALGIA, UNSPECIFIED JOINT: ICD-10-CM

## 2024-04-03 DIAGNOSIS — M89.8X9 BONE PAIN: Primary | ICD-10-CM

## 2024-04-08 ENCOUNTER — HOSPITAL ENCOUNTER (OUTPATIENT)
Dept: RADIOLOGY | Facility: HOSPITAL | Age: 69
Discharge: HOME | End: 2024-04-08
Attending: INTERNAL MEDICINE
Payer: MEDICARE

## 2024-04-08 DIAGNOSIS — M89.8X9 BONE PAIN: ICD-10-CM

## 2024-04-08 DIAGNOSIS — M25.50 ARTHRALGIA, UNSPECIFIED JOINT: ICD-10-CM

## 2024-04-08 PROCEDURE — 78306 BONE IMAGING WHOLE BODY: CPT | Mod: 26,,, | Performed by: RADIOLOGY

## 2024-04-08 PROCEDURE — A9503 TC99M MEDRONATE: HCPCS | Performed by: INTERNAL MEDICINE

## 2024-04-08 PROCEDURE — 78306 BONE IMAGING WHOLE BODY: CPT | Mod: TC

## 2024-04-08 RX ADMIN — TECHNETIUM TC 99M MEDRONATE 29.5 MILLICURIE: 20 INJECTION, POWDER, LYOPHILIZED, FOR SOLUTION INTRAVENOUS at 08:04

## 2024-04-11 ENCOUNTER — OFFICE VISIT (OUTPATIENT)
Dept: HEMATOLOGY/ONCOLOGY | Facility: CLINIC | Age: 69
End: 2024-04-11
Payer: MEDICARE

## 2024-04-11 VITALS
TEMPERATURE: 98 F | SYSTOLIC BLOOD PRESSURE: 155 MMHG | WEIGHT: 140.81 LBS | BODY MASS INDEX: 26.6 KG/M2 | DIASTOLIC BLOOD PRESSURE: 94 MMHG | HEART RATE: 69 BPM | RESPIRATION RATE: 17 BRPM

## 2024-04-11 DIAGNOSIS — R07.89 ACUTE CHEST WALL PAIN: Primary | ICD-10-CM

## 2024-04-11 DIAGNOSIS — R94.8 ABNORMAL RADIONUCLIDE BONE SCAN: ICD-10-CM

## 2024-04-11 PROCEDURE — 99213 OFFICE O/P EST LOW 20 MIN: CPT | Mod: S$GLB,,, | Performed by: INTERNAL MEDICINE

## 2024-04-11 NOTE — LETTER
April 14, 2024        Bharath Pierre III, MD  [address]  [place] [address]             [facility] - Hematology Oncology  [address] [place] [address]  Phone: [phone]  Fax: [phone]   Patient: Fanta Berg   MR Number: [id number]   YOB: 1955   Date of Visit: 4/11/2024       Dear Dr. Pierre:    Thank you for referring Fanta Berg to me for evaluation. Below are the relevant portions of my assessment and plan of care.            If you have questions, please do not hesitate to call me. I look forward to following Fanta along with you.    Sincerely,      PRIYANK Noonan MD           CC    No Recipients

## 2024-04-11 NOTE — PROGRESS NOTES
[facility] hematology Oncology in office subsequent encounter note  24    Subjective:      Patient ID:   Fanta Berg  68 y.o. female  1955  [place]      Chief Complaint:  History of non-Hodgkin's lymphoma.     Hx of bilateral breast cancer.    Bilateral mastectomy with reconstruction.  Adjuvant tamoxifen x's 5 years.    She c/o LBP, L hip pain, shoulder pain.  Check Bone Scan.  She has DJD and osteopenia.    She has GERD,  she is on Celebrex and Nexium.  LBP sx.  MRI with DDD, DJD.  She was recently evaluated for GB sx.    MGUS . Followed at [facility].    Hx small cell NHL, treated with -R x's 6, then Rituxan maintenance.  Had neuro? Sx after Rituxan 2015, Rituxan D/C ed. Low grade NHL.  Follicular vs marginal NHL. .    Chronic LBP sx 2nd DDD. S/P epidural injections.  Chronic neck pains, hx C spine fx.  On PT.    Hx HTN, rheumatoid fever as a child, GERD, arthritis sx, DDD.    Ovarian tumor removed at [facility] 2005.  R MRM 2005  L mastectomy   T & A  Back surgery   C section x's 3  Breast implants L & R 2006  Port removed 2012  Complete hysterectomy [facility]  M0    Allergy benadryl, codiene, levaquin    Smoke rarely, ETOH no, Job , disabled    Mom small cell lung cancer  Dad pancreatic cancer  Sister HTN  Son AMI  Daughter schizophrenia  Daughter panic attacks  Mom, sisters, daughters no breast cancer    ROS:   GEN: normal without any fever, night sweats or weight loss  HEENT:See HPI.   sore throat, stiff neck, changes in vision  CV: RF as a child,     no CP, SOB, PND, MACHADO or orthopnea  PULM: normal with no SOB, cough, hemoptysis, sputum or pleuritic pain  GI: GERD+.   no abdominal pain, nausea, vomiting, constipation, diarrhea, melanotic stools, BRBPR, or hematemesis  : normal with no hematuria, dysuria  BREAST: See HPI  SKIN: normal with no rash, erythema, bruising, or swelling     Past Medical History:   Diagnosis Date    Breast  "cancer     Breast cancer     bilat mast, chemo, arimidex     mda    Cancer     Cardiomyopathy due to chemotherapy     DDD (degenerative disc disease)     GERD (gastroesophageal reflux disease)     NHL (nodular histiocytic lymphoma)     Non Hodgkin's lymphoma     Non-Hodgkin lymphoma     Small cell B-cell lymphoma      Past Surgical History:   Procedure Laterality Date    BACK SURGERY      BREAST RECONSTRUCTION Bilateral 2006    [facility]     BREAST RECONSTRUCTION Bilateral 2006    [facility]    BREAST SURGERY       SECTION      COLONOSCOPY  2012    NARA THOMAS MD    ESOPHAGOGASTRODUODENOSCOPY  2012    NARA THOMAS MD    HYSTERECTOMY      MEDIPORT REMOVAL Right 2020    Procedure: REMOVAL, CATHETER, CENTRAL VENOUS, TUNNELED WITH PORT;  Surgeon: Jeff Amin III, MD;  Location: [facility] OR;  Service: General;  Laterality: Right;    port-a-cath placement Right     SPINE SURGERY      TONSILLECTOMY      TOTAL ABDOMINAL HYSTERECTOMY W/ BILATERAL SALPINGOOPHORECTOMY         Review of patient's allergies indicates:   Allergen Reactions    Benadryl [diphenhydramine hcl] Anaphylaxis    Levaquin [levofloxacin] Other (See Comments)     Permanent tendon & ligament damage    Ibuprofen-diphenhydramine cit Nausea Only      "Any meds with PM behind them "    Sulfamethoxazole-trimethoprim Nausea And Vomiting    Tolmetin Nausea Only    Codeine Palpitations and Rash    Duloxetine hcl Other (See Comments)     "increased sweating"         Current Outpatient Medications:     acetaminophen (TYLENOL) 650 MG TbSR, Take 650 mg by mouth every 8 (eight) hours., Disp: , Rfl:     acyclovir 5% (ZOVIRAX) 5 % ointment, Apply topically 5 (five) times daily., Disp: 30 g, Rfl: 1    ARTHRITIS PAIN, DICLOFENAC, 1 % Gel, APPLY 2 GRAMS TOPICALLY TWICE DAILY AS NEEDED, Disp: 100 g, Rfl: 0    ascorbic acid/multivit-min (VITAMIN C FIZZY DRINK ORAL), Take 1,000 mg by mouth once daily., Disp: , Rfl:     " "cholecalciferol, vitamin D3, 125 mcg (5,000 unit) Tab, Take 5,000 Units by mouth once daily., Disp: , Rfl:     ciclopirox (PENLAC) 8 % Soln, Apply topically nightly. (Patient not taking: Reported on 4/11/2024), Disp: 6.6 mL, Rfl: 1    esomeprazole (NEXIUM) 40 MG capsule, Take once daily with celebrex as needed, Disp: 90 capsule, Rfl: 2    fluticasone propionate (FLONASE) 50 mcg/actuation nasal spray, SQUIRT 2 SPRAYS IN EACH NOSTRIL EVERY DAY AS DIRECTED, Disp: 16 g, Rfl: 10    ibuprofen (ADVIL,MOTRIN) 600 MG tablet, Take 1 tablet (600 mg total) by mouth 2 (two) times daily as needed for Pain., Disp: 60 tablet, Rfl: 3    mupirocin (BACTROBAN) 2 % ointment, APPLY TO AFFECTED AREA TOPICALLY ONCE DAILY AS DIRECTED, Disp: 22 g, Rfl: 1    simethicone (MYLICON) 125 mg Cap capsule, Take 1 capsule (125 mg total) by mouth daily as needed for Flatulence., Disp: 30 capsule, Rfl: 1      Objective:   Vitals:  Blood pressure (!) 155/94, pulse 69, temperature 98.1 °F (36.7 °C), resp. rate 17, weight 63.9 kg (140 lb 12.8 oz).    Physical Examination:   GEN: no apparent distress, comfortable  HEAD: atraumatic and normocephalic  EYES: no pallor, no icterus  ENT: no pharyngeal erythema, external ears WNL; no nasal discharge; no thrush  NECK: no masses, thyroid normal, trachea midline, no LAD/LN's, supple  CV: RRR with no murmur; normal pulse; normal S1 and S2; no pedal edema  CHEST: Normal respiratory effort; CTAB; normal breath sounds; no wheeze or crackles  ABDOM: nontender and nondistended; soft;  no rebound/guarding  MUSC/Skeletal: ROM normal; no crepitus; joints normal  EXTREM: no clubbing, cyanosis, inflammation or swelling  SKIN: no rashes, lesions, ulcers, petechiae  : no CVAT  NEURO: grossly intact; motor/sensory WNL;  no tremors  PSYCH: normal mood, affect and behavior  LYMPH: normal cervical, supraclavicular, axillary and groin LN's  "BREASTS": no palpable mass.  I do not palpate fullness or tenderness on examination of " the left or right chest.  She has extensive postop change with bilateral implants in place.      Labs:   Lab Results   Component Value Date    WBC 10.1 01/19/2024    HGB 13.3 01/19/2024    HCT 39.1 01/19/2024    MCV 90.1 01/19/2024     01/19/2024    CMP  Sodium   Date Value Ref Range Status   01/19/2024 140 135 - 146 mmol/L Final   10/09/2017 137 134 - 144 mmol/L      Potassium   Date Value Ref Range Status   01/19/2024 4.7 3.5 - 5.3 mmol/L Final     Chloride   Date Value Ref Range Status   01/19/2024 103 98 - 110 mmol/L Final   10/09/2017 100 98 - 110 mmol/L      CO2   Date Value Ref Range Status   01/19/2024 27 20 - 32 mmol/L Final     Glucose   Date Value Ref Range Status   01/19/2024 86 65 - 139 mg/dL Final     Comment:               Non-fasting reference interval        10/09/2017 87 70 - 99 mg/dL      BUN   Date Value Ref Range Status   01/19/2024 16 7 - 25 mg/dL Final     Creatinine   Date Value Ref Range Status   01/19/2024 0.77 0.60 - 1.00 mg/dL Final   10/09/2017 0.63 0.60 - 1.40 mg/dL      Calcium   Date Value Ref Range Status   01/19/2024 9.9 8.6 - 10.4 mg/dL Final     Total Protein   Date Value Ref Range Status   01/19/2024 7.1 6.1 - 8.1 g/dL Final     Albumin   Date Value Ref Range Status   01/19/2024 4.4 3.6 - 5.1 g/dL Final   10/09/2017 4.3 3.1 - 4.7 g/dL      Total Bilirubin   Date Value Ref Range Status   01/19/2024 0.6 0.2 - 1.2 mg/dL Final     Alkaline Phosphatase   Date Value Ref Range Status   04/16/2020 79 37 - 153 U/L Final     AST   Date Value Ref Range Status   01/19/2024 16 10 - 35 U/L Final     ALT   Date Value Ref Range Status   01/19/2024 13 6 - 29 U/L Final     Anion Gap   Date Value Ref Range Status   02/06/2020 11 8 - 16 mmol/L Final   02/06/2020 11 8 - 16 mmol/L Final     eGFR if    Date Value Ref Range Status   03/08/2022 104 > OR = 60 mL/min/1.73m2 Final     eGFR if non    Date Value Ref Range Status   03/08/2022 90 > OR = 60 mL/min/1.73m2  Final       Radiology/Diagnostic Studies:    PET scan July 2020 does not show evidence for recurrence or metastatic cancer, pulmonary nodule is stable since 2013.  Marta catheter was removed in February 2020.    Assessment:   (1) 68 y.o. female with diagnosis of  Bilateral breast cancer 2005, low grade NHL 2013.    No evidence for recurrent breast cancer or NHL, on last Pet 7/2020.    RML pulmonary nodule, stable since 2013.    (2)Joint, bone pain, check bone scan.  The bone scan shows suspected Fx R 8th and L 9th ribs.  Bone Scan shows multiple areas of DJD.  She has osteopenia per bone density.    Metastatic dx not seen.  Rib x rays ordered at [facility].    RTC 6 months.    Addendum: X ray of ribs did not show fx or lytic area or abnormality.  We will observe for now, and check her in 6 months.

## 2024-04-12 ENCOUNTER — HOSPITAL ENCOUNTER (OUTPATIENT)
Dept: RADIOLOGY | Facility: HOSPITAL | Age: 69
Discharge: HOME | End: 2024-04-12
Attending: INTERNAL MEDICINE
Payer: MEDICARE

## 2024-04-12 DIAGNOSIS — R94.8 ABNORMAL RADIONUCLIDE BONE SCAN: ICD-10-CM

## 2024-04-12 DIAGNOSIS — R07.89 ACUTE CHEST WALL PAIN: ICD-10-CM

## 2024-04-12 PROCEDURE — 71110 X-RAY EXAM RIBS BIL 3 VIEWS: CPT | Mod: 26,,, | Performed by: RADIOLOGY

## 2024-04-12 PROCEDURE — 71110 X-RAY EXAM RIBS BIL 3 VIEWS: CPT | Mod: TC

## 2024-04-21 ENCOUNTER — TELEPHONE (OUTPATIENT)
Dept: HEMATOLOGY/ONCOLOGY | Facility: CLINIC | Age: 69
End: 2024-04-21

## 2024-04-22 NOTE — TELEPHONE ENCOUNTER
Spoke with patient and informed her that per Dr. Noonan no broken ribs seen and that he wants her to follow up in 6 months. Pt verbalized understanding. Advised would have Ms Jenkins call her back with an appt. Pt verbalized understanding.

## 2024-10-23 ENCOUNTER — OFFICE VISIT (OUTPATIENT)
Facility: CLINIC | Age: 69
End: 2024-10-23
Payer: MEDICARE

## 2024-10-23 VITALS
DIASTOLIC BLOOD PRESSURE: 85 MMHG | HEART RATE: 71 BPM | SYSTOLIC BLOOD PRESSURE: 139 MMHG | RESPIRATION RATE: 16 BRPM | WEIGHT: 142 LBS | TEMPERATURE: 98 F | BODY MASS INDEX: 26.83 KG/M2

## 2024-10-23 DIAGNOSIS — M51.370 DEGENERATION OF INTERVERTEBRAL DISC OF LUMBOSACRAL REGION WITH DISCOGENIC BACK PAIN: ICD-10-CM

## 2024-10-23 DIAGNOSIS — C50.111 MALIGNANT NEOPLASM OF CENTRAL PORTION OF RIGHT BREAST IN FEMALE, ESTROGEN RECEPTOR POSITIVE: ICD-10-CM

## 2024-10-23 DIAGNOSIS — C83.09 SMALL B-CELL LYMPHOMA OF EXTRANODAL SITE EXCLUDING SPLEEN AND OTHER SOLID ORGANS: Primary | ICD-10-CM

## 2024-10-23 DIAGNOSIS — Z17.0 MALIGNANT NEOPLASM OF CENTRAL PORTION OF RIGHT BREAST IN FEMALE, ESTROGEN RECEPTOR POSITIVE: ICD-10-CM

## 2024-10-23 PROCEDURE — 99999 PR PBB SHADOW E&M-EST. PATIENT-LVL III: CPT | Mod: PBBFAC,,, | Performed by: INTERNAL MEDICINE

## 2024-10-23 PROCEDURE — 99213 OFFICE O/P EST LOW 20 MIN: CPT | Mod: PBBFAC,PN | Performed by: INTERNAL MEDICINE

## 2024-10-23 NOTE — LETTER
October 24, 2024        Bharath Pierre III, MD  [address]  [place] [address]             [facility]  [address] [facility]  [address]  [place] [address]  Phone: [phone]  Fax: [phone]   Patient: Fanta Berg   MR Number: [id number]   YOB: 1955   Date of Visit: 10/23/2024       Dear Dr. Pierre:    Thank you for referring Fanta Berg to me for evaluation. Below are the relevant portions of my assessment and plan of care.            If you have questions, please do not hesitate to call me. I look forward to following Fanta along with you.    Sincerely,      PRIYANK Noonan MD           CC    No Recipients

## 2024-10-25 NOTE — PROGRESS NOTES
[facility] [address] hematology Oncology in office subsequent encounter note    10;23;24    Subjective:      Patient ID:   Fanta Berg  69 y.o. female  1955  [place]      Chief Complaint:  History of non-Hodgkin's lymphoma.     Hx of bilateral breast cancer.    Bilateral mastectomy with reconstruction.  Adjuvant tamoxifen x's 5 years.    She c/o LBP, L hip pain, shoulder pain.  Check Bone Scan.  She has DJD and osteopenia.    She has GERD,  she is on Celebrex and Nexium.  LBP sx.  MRI with DDD, DJD.  She was recently evaluated for GB sx.    MGUS . Followed at [facility].    Hx small cell NHL, treated with -R x's , then Rituxan maintenance.  Had neuro? Sx after Rituxan 2015, Rituxan D/C ed. Low grade NHL.  Follicular vs marginal NHL. .    Chronic LBP sx 2nd DDD. S/P epidural injections.  Chronic neck pains, hx C spine fx.  On PT.    Hx HTN, rheumatoid fever as a child, GERD, arthritis sx, DDD.    Ovarian tumor removed at [facility] 2005.  R MRM 2005  L mastectomy   T & A  Back surgery   C section x's 3  Breast implants L & R 2006  Port removed 2012  Complete hysterectomy [facility]  M0    She has joint pain sx, DJD, on Mobic 15mg  [phone]    Allergy benadryl, codiene, levaquin    Smoke rarely, ETOH no, Job , disabled    Mom small cell lung cancer  Dad pancreatic cancer  Sister HTN  Son AMI  Daughter schizophrenia  Daughter panic attacks  Mom, sisters, daughters no breast cancer    ROS:   GEN: normal without any fever, night sweats or weight loss  HEENT:See HPI.   sore throat, stiff neck, changes in vision  CV: RF as a child,     no CP, SOB, PND, MACHADO or orthopnea  PULM: normal with no SOB, cough, hemoptysis, sputum or pleuritic pain  GI: GERD+.   no abdominal pain, nausea, vomiting, constipation, diarrhea, melanotic stools, BRBPR, or hematemesis  : normal with no hematuria, dysuria  BREAST: See HPI  SKIN: normal with no rash, erythema, bruising, or  "swelling     Past Medical History:   Diagnosis Date    Breast cancer     Breast cancer     bilat mast, chemo, arimidex     mda    Cancer     Cardiomyopathy due to chemotherapy     DDD (degenerative disc disease)     GERD (gastroesophageal reflux disease)     NHL (nodular histiocytic lymphoma)     Non Hodgkin's lymphoma     Non-Hodgkin lymphoma     Small cell B-cell lymphoma      Past Surgical History:   Procedure Laterality Date    BACK SURGERY      BREAST RECONSTRUCTION Bilateral 2006    [facility]     BREAST RECONSTRUCTION Bilateral 2006    [facility]    BREAST SURGERY       SECTION      COLONOSCOPY  2012    NARA THOMAS MD    ESOPHAGOGASTRODUODENOSCOPY  2012    NARA THOMAS MD    HYSTERECTOMY      MEDIPORT REMOVAL Right 2020    Procedure: REMOVAL, CATHETER, CENTRAL VENOUS, TUNNELED WITH PORT;  Surgeon: Jeff Amin III, MD;  Location: [facility];  Service: General;  Laterality: Right;    port-a-cath placement Right     SPINE SURGERY      TONSILLECTOMY      TOTAL ABDOMINAL HYSTERECTOMY W/ BILATERAL SALPINGOOPHORECTOMY         Review of patient's allergies indicates:   Allergen Reactions    Benadryl [diphenhydramine hcl] Anaphylaxis    Levaquin [levofloxacin] Other (See Comments)     Permanent tendon & ligament damage    Ibuprofen-diphenhydramine cit Nausea Only      "Any meds with PM behind them "    Sulfamethoxazole-trimethoprim Nausea And Vomiting    Tolmetin Nausea Only    Codeine Palpitations and Rash    Duloxetine hcl Other (See Comments)     "increased sweating"         Current Outpatient Medications:     meloxicam (MOBIC) 15 MG tablet, Take 1 tablet (15 mg total) by mouth once daily., Disp: 90 tablet, Rfl: 3    acetaminophen (TYLENOL) 650 MG TbSR, Take 650 mg by mouth every 8 (eight) hours., Disp: , Rfl:     acyclovir 5% (ZOVIRAX) 5 % ointment, Apply topically 5 (five) times daily., Disp: 30 g, Rfl: 1    ARTHRITIS PAIN, DICLOFENAC, 1 % Gel, APPLY 2 GRAMS " "TOPICALLY TWICE DAILY AS NEEDED, Disp: 100 g, Rfl: 0    ascorbic acid/multivit-min (VITAMIN C FIZZY DRINK ORAL), Take 1,000 mg by mouth once daily., Disp: , Rfl:     biotin 1 mg Cap, Take 2.5 mg by mouth Daily., Disp: , Rfl:     cholecalciferol, vitamin D3, 125 mcg (5,000 unit) Tab, Take 5,000 Units by mouth once daily., Disp: , Rfl:     ciclopirox (PENLAC) 8 % Soln, Apply topically nightly., Disp: 6.6 mL, Rfl: 1    esomeprazole (NEXIUM) 40 MG capsule, Take once daily with celebrex as needed, Disp: 90 capsule, Rfl: 2    fluticasone propionate (FLONASE) 50 mcg/actuation nasal spray, SQUIRT 2 SPRAYS IN EACH NOSTRIL EVERY DAY AS DIRECTED, Disp: 16 g, Rfl: 10    mupirocin (BACTROBAN) 2 % ointment, APPLY TO AFFECTED AREA TOPICALLY ONCE DAILY AS DIRECTED, Disp: 22 g, Rfl: 1    simethicone (MYLICON) 125 mg Cap capsule, Take 1 capsule (125 mg total) by mouth daily as needed for Flatulence., Disp: 30 capsule, Rfl: 1      Objective:   Vitals:  Blood pressure 139/85, pulse 71, temperature 98.2 °F (36.8 °C), resp. rate 16, weight 64.4 kg (142 lb).    Physical Examination:   GEN: no apparent distress, comfortable  HEAD: atraumatic and normocephalic  EYES: no pallor, no icterus  ENT: no pharyngeal erythema, external ears WNL; no nasal discharge; no thrush  NECK: no masses, thyroid normal, trachea midline, no LAD/LN's, supple  CV: RRR with no murmur; normal pulse; normal S1 and S2; no pedal edema  CHEST: Normal respiratory effort; CTAB; normal breath sounds; no wheeze or crackles  ABDOM: nontender and nondistended; soft;  no rebound/guarding  MUSC/Skeletal: ROM normal; no crepitus; joints normal  EXTREM: no clubbing, cyanosis, inflammation or swelling  SKIN: no rashes, lesions, ulcers, petechiae  : no CVAT  NEURO: grossly intact; motor/sensory WNL;  no tremors  PSYCH: normal mood, affect and behavior  LYMPH: normal cervical, supraclavicular, axillary and groin LN's  "BREASTS": no palpable mass.  I do not palpate fullness or " tenderness on examination of the left or right chest.  She has extensive postop change with bilateral implants in place.      Labs:   Lab Results   Component Value Date    WBC 10.1 01/19/2024    HGB 13.3 01/19/2024    HCT 39.1 01/19/2024    MCV 90.1 01/19/2024     01/19/2024    CMP  Sodium   Date Value Ref Range Status   01/19/2024 140 135 - 146 mmol/L Final   10/09/2017 137 134 - 144 mmol/L      Potassium   Date Value Ref Range Status   01/19/2024 4.7 3.5 - 5.3 mmol/L Final     Chloride   Date Value Ref Range Status   01/19/2024 103 98 - 110 mmol/L Final   10/09/2017 100 98 - 110 mmol/L      CO2   Date Value Ref Range Status   01/19/2024 27 20 - 32 mmol/L Final     Glucose   Date Value Ref Range Status   01/19/2024 86 65 - 139 mg/dL Final     Comment:               Non-fasting reference interval        10/09/2017 87 70 - 99 mg/dL      BUN   Date Value Ref Range Status   01/19/2024 16 7 - 25 mg/dL Final     Creatinine   Date Value Ref Range Status   01/19/2024 0.77 0.60 - 1.00 mg/dL Final   10/09/2017 0.63 0.60 - 1.40 mg/dL      Calcium   Date Value Ref Range Status   01/19/2024 9.9 8.6 - 10.4 mg/dL Final     Total Protein   Date Value Ref Range Status   01/19/2024 7.1 6.1 - 8.1 g/dL Final     Albumin   Date Value Ref Range Status   01/19/2024 4.4 3.6 - 5.1 g/dL Final   10/09/2017 4.3 3.1 - 4.7 g/dL      Total Bilirubin   Date Value Ref Range Status   01/19/2024 0.6 0.2 - 1.2 mg/dL Final     Alkaline Phosphatase   Date Value Ref Range Status   04/16/2020 79 37 - 153 U/L Final     AST   Date Value Ref Range Status   01/19/2024 16 10 - 35 U/L Final     ALT   Date Value Ref Range Status   01/19/2024 13 6 - 29 U/L Final     Anion Gap   Date Value Ref Range Status   02/06/2020 11 8 - 16 mmol/L Final   02/06/2020 11 8 - 16 mmol/L Final     eGFR if    Date Value Ref Range Status   03/08/2022 104 > OR = 60 mL/min/1.73m2 Final     eGFR if non    Date Value Ref Range Status   03/08/2022  90 > OR = 60 mL/min/1.73m2 Final       Radiology/Diagnostic Studies:    PET scan July 2020 does not show evidence for recurrence or metastatic cancer, pulmonary nodule is stable since 2013.  Marta catheter was removed in February 2020.    Assessment:   (1) 69 y.o. female with diagnosis of  Bilateral breast cancer 2005, low grade NHL 2013.    No evidence for recurrent breast cancer or NHL, on last Pet 7/2020.    RML pulmonary nodule, stable since 2013.    (2)Joint, bone pain, check bone scan.  The bone scan shows suspected Fx R 8th and L 9th ribs.  Bone Scan shows multiple areas of DJD.  She has osteopenia per bone density.    Metastatic dx not seen.  Rib x rays ordered at [facility].    RTC 6 months.    Addendum: X ray of ribs did not show fx or lytic area or abnormality.  We will observe for now, and check her in 6 months.

## 2025-04-22 DIAGNOSIS — M25.50 POLYARTHRALGIA: Primary | ICD-10-CM

## 2025-05-23 ENCOUNTER — CLINICAL SUPPORT (OUTPATIENT)
Dept: REHABILITATION | Facility: HOSPITAL | Age: 70
End: 2025-05-23
Attending: NURSE PRACTITIONER
Payer: MEDICARE

## 2025-05-23 DIAGNOSIS — M25.50 POLYARTHRALGIA: ICD-10-CM

## 2025-05-23 PROCEDURE — 97530 THERAPEUTIC ACTIVITIES: CPT

## 2025-05-23 PROCEDURE — 97161 PT EVAL LOW COMPLEX 20 MIN: CPT

## 2025-05-23 NOTE — PROGRESS NOTES
Outpatient Rehab    Physical Therapy Evaluation    Patient Name: Fanta Berg  MRN: [id number]  YOB: 1955  Encounter Date: 5/23/2025    Therapy Diagnosis:   Encounter Diagnosis   Name Primary?    Polyarthralgia      Physician: Hattie Xiong NP    Physician Orders: Eval and Treat  Medical Diagnosis: Polyarthralgia    Visit # / Visits Authorized:  1 / 1  Insurance Authorization Period: 4/22/2025 to 4/22/2026  Date of Evaluation: 5/23/2025  Plan of Care Certification: 5/23/2025 to 8/23/25     Time In: 0945   Time Out: 1040  Total Time (in minutes): 55   Total Billable Time (in minutes):      Intake Outcome Measure for FOTO Survey    Therapist reviewed FOTO scores for Fanta Berg on 5/23/2025.   FOTO report - see Media section or FOTO account episode details.     Intake Score:  %    Precautions:       Subjective   History of Present Illness  Fanta is a 69 y.o. female      The patient reports a medical diagnosis of M25.50 (ICD-10-CM) - Polyarthralgia. The patient has experienced this issue since 05/23/25.           Dominant Hand: Right  History of Present Condition/Illness: Pt reports she had bilateral breast cancer in 2005 and had in plants put in 2006. Pt reports her chemo and radiation for breast cancer was awful. Pt reports her right arm will swell up. Pt was set up in [place] for lymphedema but due to finical reasons pt  is here to see what can be done for her increased swelling.     Activities of Daily Living  Social history was obtained from Patient.    General Prior Level of Function Comments: I  General Current Level of Function Comments: I       Previously independent with activities of daily living? Yes     Currently independent with activities of daily living? Yes          Previously independent with instrumental activities of daily living? Yes     Currently independent with instrumental activities of daily living? Yes              Pain     Patient reports a current pain level of 0/10.  Pain at best is reported as 0/10. Pain at worst is reported as 7/10.   Location: Right arm  Clinical Progression (since onset): Worsening  Pain Qualities: Aching, Needle-like         Review of Systems  Patient reports: Cancer History        Treatment History  Treatments  Previously Received Treatments: No  Currently Receiving Treatments: No    Living Arrangements  Living Situation  Living Arrangements: Alone        Employment  Employment Status: Retired          Past Medical History/Physical Systems Review:   Fanta Berg  has a past medical history of Breast cancer, Breast cancer, Cancer, Cardiomyopathy due to chemotherapy, DDD (degenerative disc disease), GERD (gastroesophageal reflux disease), NHL (nodular histiocytic lymphoma), Non Hodgkin's lymphoma, Non-Hodgkin lymphoma, and Small cell B-cell lymphoma.    Fanta Berg  has a past surgical history that includes Hysterectomy; Tonsillectomy; Back surgery;  section; Total abdominal hysterectomy w/ bilateral salpingoophorectomy; Spine surgery; port-a-cath placement (Right, ); Breast surgery; Breast reconstruction (Bilateral, ); Breast reconstruction (Bilateral, ); Mediport removal (Right, 2020); Colonoscopy (2012); and Esophagogastroduodenoscopy (2012).    Fanta has a current medication list which includes the following prescription(s): acetaminophen, acyclovir 5%, arthritis pain (diclofenac), ascorbic acid/multivit-min, biotin, cholecalciferol (vitamin d3), ciclopirox, esomeprazole, fluticasone propionate, meloxicam, mupirocin, and simethicone.    Review of patient's allergies indicates:   Allergen Reactions    Benadryl [diphenhydramine hcl] Anaphylaxis    Levaquin [levofloxacin] Other (See Comments)     PATIENT STATES THIS MEDICATION CAUSES HER JOINT PAIN AND WEAKNESS  n Tendinopathy  Tendon, Ligament damage from use after infection  Permanent tendon & ligament damage    Sulfamethoxazole-trimethoprim Nausea And Vomiting  "   Tolmetin Nausea Only    Celebrex [celecoxib] Anxiety     Had 5 panic attacks within a 5 day time span.    Codeine Palpitations and Rash     Breathing difficulty    Duloxetine hcl Other (See Comments)     "increased sweating"        Objective      Shoulder Range of Motion     Shoulder, Elbow, or Forearm Range of Motion Details: All ROM are within normal limits.          Shoulder Strength - Planes of Motion   Right Strength Right Pain Left Strength Left  Pain   Flexion 5   5     Extension 5   5     ABduction 5   5     ADduction 5   5     Horizontal ABduction 5   5     Horizontal ADduction 5   5     Internal Rotation 0° 5   5     Internal Rotation 90° 5   5     External Rotation 0° 5   5     External Rotation 90° 5   5                      Treatment:  Therapeutic Activity  TA 1: HEP to include arm sweeping  TA 2: education on lymp and importance of getting in with a specialist.    Time Entry(in minutes):  PT Evaluation (Low) Time Entry: 30  Therapeutic Activity Time Entry: 25    Assessment & Plan   Assessment  Fanta presents with a condition of Low complexity.   Presentation of Symptoms: Stable       Functional Limitations: Activity tolerance, Carrying objects, Completing work/school activities, Pain when reaching, Pain with ADLs/IADLs, Sitting tolerance, Reaching  Impairments: Activity intolerance, Abnormal or restricted range of motion, Impaired physical strength, Lack of appropriate home exercise program, Pain with functional activity    Prognosis: Excellent  Assessment Details: Patient presents to PT with complaints of swelling in her right arm due to lymph nodes removal from Bilateral breast cancer. Pt was offered to go see a lymph PT specialist but the distance was too far to travel. At this time I recommended pt to go see [facility] lymph specialist in [place]. I gave pt educations on HEP at this time.     Plan  From a physical therapy perspective, the patient would benefit from: Skilled Rehab " Services    Planned therapy interventions include: Therapeutic activities, Therapeutic exercise, Neuromuscular re-education, Manual therapy, ADLs/IADLs, and Gait training.    Planned modalities to include: Electrical stimulation - attended, Electrical stimulation - passive/unattended, Ultrasound, Thermotherapy (hot pack), and Low-level laser therapy.        Visit Frequency: 2 times Per Week for 6 Weeks.       This plan was discussed with Patient and Therapy assistant.   Discussion participants: Agreed Upon Plan of Care             The patient's spiritual, cultural, and educational needs were considered, and the patient is agreeable to the plan of care and goals.           Goals:   Active       LTG       Pt will improve FOTO.        Start:  05/23/25    Expected End:  06/27/25            Pt will improve QOL and be able to go fishing without pain.        Start:  05/23/25    Expected End:  06/27/25            Pt will improve UE exercises with no complaint of pain and good body mechanics.        Start:  05/23/25    Expected End:  06/27/25               STG       Pt will be compliant with HEP.        Start:  05/23/25    Expected End:  06/06/25            Pt will be set up with a lymph certified PT.        Start:  05/23/25    Expected End:  06/06/25            Pt will improve pain to 2/ 10 at worse to be able to sleep at night.        Start:  05/23/25    Expected End:  06/06/25                Roxane Cueva PT

## 2025-05-23 NOTE — LETTER
May 23, 2025  Hattie Xiong NP  [address] [place] [address]    To whom it may concern,     The attached plan of care is being sent to you for review and reference.    You may indicate your approval by signing the document electronically, or by faxing/mailing a signed copy of the final page of this document back to the attention of Roxane Cueva, PT:         Plan of Care 5/23/25   Effective from: 5/23/2025  Effective to: 8/23/2025    Plan ID: [id number]            Participants as of Finalize on 5/23/2025    Name Type Comments Contact Info    Hattie Xiong NP Referring Provider  [phone]    Roxane Cueva PT Physical Therapist         Last Plan Note     Author: Roxane Cueva PT Status: Addendum Last edited: 5/23/2025 10:00 AM         Outpatient Rehab    Physical Therapy Evaluation    Patient Name: Fanta Berg  MRN: [id number]  YOB: 1955  Encounter Date: 5/23/2025    Therapy Diagnosis:   Encounter Diagnosis   Name Primary?    Polyarthralgia      Physician: Hattie Xiong NP    Physician Orders: Eval and Treat  Medical Diagnosis: Polyarthralgia    Visit # / Visits Authorized:  1 / 1  Insurance Authorization Period: 4/22/2025 to 4/22/2026  Date of Evaluation: 5/23/2025  Plan of Care Certification: 5/23/2025 to 8/23/25     Time In: 0945   Time Out: 1040  Total Time (in minutes): 55   Total Billable Time (in minutes):      Intake Outcome Measure for FOTO Survey    Therapist reviewed FOTO scores for Fanta Berg on 5/23/2025.   FOTO report - see Media section or FOTO account episode details.     Intake Score:  %    Precautions:       Subjective   History of Present Illness  Fanta is a 69 y.o. female      The patient reports a medical diagnosis of M25.50 (ICD-10-CM) - Polyarthralgia. The patient has experienced this issue since 05/23/25.           Dominant Hand: Right  History of Present Condition/Illness: Pt reports she had bilateral breast cancer in 2005 and had  in plants put in . Pt reports her chemo and radiation for breast cancer was awful. Pt reports her right arm will swell up. Pt was set up in [place] for lymphedema but due to finical reasons pt  is here to see what can be done for her increased swelling.     Activities of Daily Living  Social history was obtained from Patient.    General Prior Level of Function Comments: I  General Current Level of Function Comments: I       Previously independent with activities of daily living? Yes     Currently independent with activities of daily living? Yes          Previously independent with instrumental activities of daily living? Yes     Currently independent with instrumental activities of daily living? Yes              Pain     Patient reports a current pain level of 0/10. Pain at best is reported as 0/10. Pain at worst is reported as 7/10.   Location: Right arm  Clinical Progression (since onset): Worsening  Pain Qualities: Aching, Needle-like         Review of Systems  Patient reports: Cancer History        Treatment History  Treatments  Previously Received Treatments: No  Currently Receiving Treatments: No    Living Arrangements  Living Situation  Living Arrangements: Alone        Employment  Employment Status: Retired          Past Medical History/Physical Systems Review:   Fanta Berg  has a past medical history of Breast cancer, Breast cancer, Cancer, Cardiomyopathy due to chemotherapy, DDD (degenerative disc disease), GERD (gastroesophageal reflux disease), NHL (nodular histiocytic lymphoma), Non Hodgkin's lymphoma, Non-Hodgkin lymphoma, and Small cell B-cell lymphoma.    Fanta Berg  has a past surgical history that includes Hysterectomy; Tonsillectomy; Back surgery;  section; Total abdominal hysterectomy w/ bilateral salpingoophorectomy; Spine surgery; port-a-cath placement (Right, 2015); Breast surgery; Breast reconstruction (Bilateral, 2006); Breast reconstruction (Bilateral, 2006); Mediport  "removal (Right, 2/11/2020); Colonoscopy (06/08/2012); and Esophagogastroduodenoscopy (06/08/2012).    Fanta has a current medication list which includes the following prescription(s): acetaminophen, acyclovir 5%, arthritis pain (diclofenac), ascorbic acid/multivit-min, biotin, cholecalciferol (vitamin d3), ciclopirox, esomeprazole, fluticasone propionate, meloxicam, mupirocin, and simethicone.    Review of patient's allergies indicates:   Allergen Reactions    Benadryl [diphenhydramine hcl] Anaphylaxis    Levaquin [levofloxacin] Other (See Comments)     PATIENT STATES THIS MEDICATION CAUSES HER JOINT PAIN AND WEAKNESS  n Tendinopathy  Tendon, Ligament damage from use after infection  Permanent tendon & ligament damage    Sulfamethoxazole-trimethoprim Nausea And Vomiting    Tolmetin Nausea Only    Celebrex [celecoxib] Anxiety     Had 5 panic attacks within a 5 day time span.    Codeine Palpitations and Rash     Breathing difficulty    Duloxetine hcl Other (See Comments)     "increased sweating"        Objective      Shoulder Range of Motion     Shoulder, Elbow, or Forearm Range of Motion Details: All ROM are within normal limits.          Shoulder Strength - Planes of Motion   Right Strength Right Pain Left Strength Left  Pain   Flexion 5   5     Extension 5   5     ABduction 5   5     ADduction 5   5     Horizontal ABduction 5   5     Horizontal ADduction 5   5     Internal Rotation 0° 5   5     Internal Rotation 90° 5   5     External Rotation 0° 5   5     External Rotation 90° 5   5                      Treatment:  Therapeutic Activity  TA 1: HEP to include arm sweeping  TA 2: education on lymp and importance of getting in with a specialist.    Time Entry(in minutes):  PT Evaluation (Low) Time Entry: 30  Therapeutic Activity Time Entry: 25    Assessment & Plan   Assessment  Fanta presents with a condition of Low complexity.   Presentation of Symptoms: Stable       Functional Limitations: Activity tolerance, " Carrying objects, Completing work/school activities, Pain when reaching, Pain with ADLs/IADLs, Sitting tolerance, Reaching  Impairments: Activity intolerance, Abnormal or restricted range of motion, Impaired physical strength, Lack of appropriate home exercise program, Pain with functional activity    Prognosis: Excellent  Assessment Details: Patient presents to PT with complaints of swelling in her right arm due to lymph nodes removal from Bilateral breast cancer. Pt was offered to go see a lymph PT specialist but the distance was too far to travel. At this time I recommended pt to go see [facility] lymph specialist in [place]. I gave pt educations on HEP at this time.     Plan  From a physical therapy perspective, the patient would benefit from: Skilled Rehab Services    Planned therapy interventions include: Therapeutic activities, Therapeutic exercise, Neuromuscular re-education, Manual therapy, ADLs/IADLs, and Gait training.    Planned modalities to include: Electrical stimulation - attended, Electrical stimulation - passive/unattended, Ultrasound, Thermotherapy (hot pack), and Low-level laser therapy.        Visit Frequency: 2 times Per Week for 6 Weeks.       This plan was discussed with Patient and Therapy assistant.   Discussion participants: Agreed Upon Plan of Care             The patient's spiritual, cultural, and educational needs were considered, and the patient is agreeable to the plan of care and goals.           Goals:   Active       LTG       Pt will improve FOTO.        Start:  05/23/25    Expected End:  06/27/25            Pt will improve QOL and be able to go fishing without pain.        Start:  05/23/25    Expected End:  06/27/25            Pt will improve UE exercises with no complaint of pain and good body mechanics.        Start:  05/23/25    Expected End:  06/27/25               STG       Pt will be compliant with HEP.        Start:  05/23/25    Expected End:  06/06/25            Pt will be  set up with a lymph certified PT.        Start:  05/23/25    Expected End:  06/06/25            Pt will improve pain to 2/ 10 at worse to be able to sleep at night.        Start:  05/23/25    Expected End:  06/06/25                Roxane Cueva PT           Current Participants as of 5/23/2025    Name Type Comments Contact Info    Hattie Xiong NP Referring Provider  [phone]    Signature pending    Roxane Cueva PT Physical Therapist                  Sincerely,      Roxane Cueva PT  [facility]                                                            Dear Roxane Cueva PT,    RE: Ms. Fanta Berg, MRN: [id number]    I certify that I have reviewed the attached plan of care and agree to the details within.        ___________________________  ___________________________  Provider Printed Name   Provider Signed Name      ___________________________  Date and Time

## 2025-06-04 ENCOUNTER — CLINICAL SUPPORT (OUTPATIENT)
Dept: REHABILITATION | Facility: HOSPITAL | Age: 70
End: 2025-06-04
Payer: MEDICARE

## 2025-06-04 DIAGNOSIS — I97.2 POST-MASTECTOMY LYMPHEDEMA SYNDROME: Primary | ICD-10-CM

## 2025-06-04 DIAGNOSIS — Z91.89 AT RISK FOR IMPAIRED SKIN INTEGRITY: ICD-10-CM

## 2025-06-04 PROCEDURE — 97530 THERAPEUTIC ACTIVITIES: CPT | Mod: PO

## 2025-07-04 ENCOUNTER — HOSPITAL ENCOUNTER (EMERGENCY)
Facility: HOSPITAL | Age: 70
Discharge: HOME | End: 2025-07-04
Attending: STUDENT IN AN ORGANIZED HEALTH CARE EDUCATION/TRAINING PROGRAM
Payer: MEDICARE

## 2025-07-04 VITALS
DIASTOLIC BLOOD PRESSURE: 77 MMHG | BODY MASS INDEX: 26.43 KG/M2 | HEIGHT: 61 IN | OXYGEN SATURATION: 97 % | TEMPERATURE: 98 F | HEART RATE: 55 BPM | SYSTOLIC BLOOD PRESSURE: 167 MMHG | WEIGHT: 140 LBS | RESPIRATION RATE: 16 BRPM

## 2025-07-04 DIAGNOSIS — I89.0 LYMPHEDEMA OF RIGHT ARM: Primary | ICD-10-CM

## 2025-07-04 DIAGNOSIS — L08.9 SOFT TISSUE INFECTION: ICD-10-CM

## 2025-07-04 PROCEDURE — 99284 EMERGENCY DEPT VISIT MOD MDM: CPT

## 2025-07-04 RX ORDER — CEPHALEXIN 500 MG/1
500 CAPSULE ORAL EVERY 12 HOURS
Qty: 14 CAPSULE | Refills: 0 | Status: SHIPPED | OUTPATIENT
Start: 2025-07-04 | End: 2025-07-11

## 2025-07-04 RX ORDER — CEPHALEXIN 500 MG/1
500 CAPSULE ORAL EVERY 12 HOURS
Qty: 14 CAPSULE | Refills: 0 | Status: SHIPPED | OUTPATIENT
Start: 2025-07-04 | End: 2025-07-04 | Stop reason: SDUPTHER

## 2025-07-04 NOTE — ED PROVIDER NOTES
"Encounter Date: 7/4/2025       History     Chief Complaint   Patient presents with    Arm Problem     Pt reports right upper arm swelling and redness and heat. Pt states "this is my lymphedema arm because I had breast cancer 20+ years ago" Pt reports that on Wednesday night she felt some pain to that upper arm area and tonight at midnight she awoke with the arm having redness and feeling hot to touch.      69-year-old female with a history of breast cancer status post mastectomy, chemotherapy 20 years ago presents to ED with complaints of tender swelling and warmth to right upper arm with onset yesterday. She reports frequent flare-up of swelling to the left upper arm which was thought to be secondary to lymphedema. She tells me it was different today is the warmth. She denies fever, chills, chest pain, palpitations, or other systemic symptoms.    The history is provided by the patient. No  was used.     Review of patient's allergies indicates:   Allergen Reactions    Benadryl [diphenhydramine hcl] Anaphylaxis    Levaquin [levofloxacin] Other (See Comments)     PATIENT STATES THIS MEDICATION CAUSES HER JOINT PAIN AND WEAKNESS  n Tendinopathy  Tendon, Ligament damage from use after infection  Permanent tendon & ligament damage    Sulfamethoxazole-trimethoprim Nausea And Vomiting    Tolmetin Nausea Only    Celebrex [celecoxib] Anxiety     Had 5 panic attacks within a 5 day time span.    Codeine Palpitations and Rash     Breathing difficulty    Duloxetine hcl Other (See Comments)     "increased sweating"     Past Medical History:   Diagnosis Date    Breast cancer     Breast cancer 2005    bilat mast, chemo, arimidex     mda    Cancer     Cardiomyopathy due to chemotherapy     DDD (degenerative disc disease)     GERD (gastroesophageal reflux disease)     NHL (nodular histiocytic lymphoma)     Non Hodgkin's lymphoma     Non-Hodgkin lymphoma     Small cell B-cell lymphoma      Past Surgical History: "   Procedure Laterality Date    BACK SURGERY      BREAST RECONSTRUCTION Bilateral 2006    MD Lyman     BREAST RECONSTRUCTION Bilateral 2006    MD Lyman    BREAST SURGERY       SECTION      COLONOSCOPY  2012    NARA THOMAS MD    ESOPHAGOGASTRODUODENOSCOPY  2012    NARA THOMAS MD    HYSTERECTOMY      MEDIPORT REMOVAL Right 2020    Procedure: REMOVAL, CATHETER, CENTRAL VENOUS, TUNNELED WITH PORT;  Surgeon: Jeff Amin III, MD;  Location: [facility] OR;  Service: General;  Laterality: Right;    port-a-cath placement Right     SPINE SURGERY      TONSILLECTOMY      TOTAL ABDOMINAL HYSTERECTOMY W/ BILATERAL SALPINGOOPHORECTOMY       Family History   Problem Relation Name Age of Onset    Lung cancer Mother      Brain cancer Mother      Liver cancer Father      Heart failure Son      Heart disease Son       Social History[1]  Review of Systems   Constitutional: Negative.    HENT: Negative.     Eyes: Negative.    Respiratory: Negative.     Cardiovascular: Negative.    Gastrointestinal: Negative.    Genitourinary: Negative.    Musculoskeletal: Negative.    Skin:  Positive for wound.   Neurological: Negative.    Hematological: Negative.    Psychiatric/Behavioral: Negative.     All other systems reviewed and are negative.      Physical Exam     Initial Vitals [25 0252]   BP Pulse Resp Temp SpO2   (!) 167/77 67 16 97.7 °F (36.5 °C) 97 %      MAP       --         Physical Exam    Nursing note and vitals reviewed.  Constitutional: She appears well-developed.   HENT:   Head: Normocephalic.   Eyes: Pupils are equal, round, and reactive to light.   Neck:   Normal range of motion.  Cardiovascular:  Normal rate.           Pulmonary/Chest: Breath sounds normal.   Abdominal: Abdomen is soft. Bowel sounds are normal.   Musculoskeletal:         General: Normal range of motion.      Cervical back: Normal range of motion.      Comments: Swelling right upper arm, some surrounding erythema, and  warmth.  Radial ulnar pulses easily palpable       Neurological: She is alert and oriented to person, place, and time. GCS score is 15. GCS eye subscore is 4. GCS verbal subscore is 5. GCS motor subscore is 6.   Skin: Skin is warm. Capillary refill takes less than 2 seconds.   Psychiatric: She has a normal mood and affect.         ED Course   Procedures  Labs Reviewed - No data to display       Imaging Results    None          Medications - No data to display  Medical Decision Making  Ddx cellulitis, erysipelas, DVT, lymphedema, others  On exam, there is swelling right upper arm, some surrounding erythema, and warmth.  Radial ulnar pulses easily palpable. Appears to be lymphedema nevertheless out of abundance of precaution we will Rx Keflex for possible developing cellulitis.  Patient was seen and reevaluated. Patient's symptoms seem to be stable. I discussed the patient's diagnosis, treatment plan, and plan for discharge with the patient. Patient was instructed to follow up with PCP and was given strict return precautions to the ED. The patient voiced understanding and agreed with the plan      Risk  Prescription drug management.                                      Clinical Impression:  Final diagnoses:  [I89.0] Lymphedema of right arm (Primary)  [L08.9] Soft tissue infection          ED Disposition Condition    Discharge Stable          ED Prescriptions       Medication Sig Dispense Start Date End Date Auth. Provider    cephALEXin (KEFLEX) 500 MG capsule  (Status: Discontinued) Take 1 capsule (500 mg total) by mouth every 12 (twelve) hours. for 7 days 14 capsule 7/4/2025 7/4/2025 Tye Jewell MD    cephALEXin (KEFLEX) 500 MG capsule Take 1 capsule (500 mg total) by mouth every 12 (twelve) hours. for 7 days 14 capsule 7/4/2025 7/11/2025 Tye Jewell MD          Follow-up Information    None                [1]   Social History  Tobacco Use    Smoking status: Former     Current packs/day: 0.00      Average packs/day: 1 pack/day for 14.3 years (14.3 ttl pk-yrs)     Types: Cigarettes     Start date: 2008     Quit date: 4/6/2022     Years since quitting: 3.2    Smokeless tobacco: Never   Substance Use Topics    Alcohol use: No    Drug use: Tye Streeter MD  07/04/25 [id number]

## 2025-07-07 ENCOUNTER — CLINICAL SUPPORT (OUTPATIENT)
Dept: REHABILITATION | Facility: HOSPITAL | Age: 70
End: 2025-07-07
Payer: MEDICARE

## 2025-07-07 DIAGNOSIS — R23.9 IMPAIRED SKIN INTEGRITY: Primary | ICD-10-CM

## 2025-07-07 PROCEDURE — 97112 NEUROMUSCULAR REEDUCATION: CPT | Mod: PO

## 2025-07-07 PROCEDURE — 97140 MANUAL THERAPY 1/> REGIONS: CPT | Mod: PO

## 2025-07-07 NOTE — PROGRESS NOTES
Outpatient Rehab    Physical Therapy Progress Note    Patient Name: Fanta Berg  MRN: [id number]  YOB: 1955  Encounter Date: 7/7/2025    Therapy Diagnosis:   Encounter Diagnosis   Name Primary?    Impaired skin integrity Yes     Physician: Hattie Xiong NP    Physician Orders: Eval and Treat  Medical Diagnosis: Polyarthralgia  Surgical Diagnosis: Not applicable for this Episode   Surgical Date: Not applicable for this Episode  Days Since Last Surgery: Not applicable for this Episode    Visit # / Visits Authorized:  2 / 20  Insurance Authorization Period: 5/23/2025 to 12/31/2025  Date of Evaluation: 6/4/2025  Plan of Care Certification: 6/4/2025 to 9/4/2025      PT/PTA:     Number of PTA visits since last PT visit:   Time In: 1010   Time Out: 1045  Total Time (in minutes): 35   Total Billable Time (in minutes):      FOTO:  Intake Score:  %  Survey Score 2:  %  Survey Score 3:  %    Precautions:     H/o breast cancer, standard      Subjective   PAtient states that she went to the ED last week 2' increased pain and inflammation, she was diagnosed with cellulitis and given roal antibiotics.   She is on day 3 of treament.   She states that her symptoms are decreasing.  She has stopped wearing the compression sleeve as directed at IE in event of infection..  Pain reported as 2/10. local to posterior right upper arm - tenderness to touch    Objective            Treatment:  Manual Therapy  MT 1: short neck  MT 2: L axillaries initial prep and fill,  establish R to L AAA  MT 3: R inguinals initial prep and fill, est R AIA  MT 4: deep and visceral abdominals  MT 5: reverse sequence.  MT 6: MFR , supine w sh at supported 90/90 - scapulohumeral dissociation 3 x 30 secs  Balance/Neuromuscular Re-Education  NMR 1: (+) relocation test on the R sh, f/b alternating isometrics in the pos of ER/IR, abd/addu, long axis sh depression, x 10 ea, for mm activation and stabilization. Holds each position to allow for  max muscle recruitment and activation.  Other Activities  Activity 1: Reviewed with patient precautions 2' recent h/o cellulitis.  She was told to hold off on use of sleeve until next visit.  She is going to continue with her truncal MLD.  She stated understanding.    Time Entry(in minutes):       Assessment & Plan   Assessment: Patient with good understanding of self monitoring and need for medical intervention.   She tolerated proximal manual lymphatic drainage well, will resume distal sequences once patient has completed 72 hours of antibiotics.  Her arm appears to be responding to the antibiotics, evidenced by decreased local inflammation, warmth and pain.     Evaluation/Treatment Tolerance: Other (Comment) (proximal tretment only until complete 72 hours of antibiotics.   She tolerated well.)    The patient will continue to benefit from skilled outpatient physical therapy in order to address the deficits listed in the problem list on the initial evaluation, provide patient and family education, and maximize the patients level of independence in the home and community environments.     The patient's spiritual, cultural, and educational needs were considered, and the patient is agreeable to the plan of care and goals.           Plan:      Goals:   Active       LTG x 12 visits       Patient will show decreased girth total measurement in RUE by up to 3 cm to allow for upper extremity symmetry, shoe and clothing choice, and the ability to apply needed compression daily.  (Progressing)       Start:  06/04/25    Expected End:  09/04/25            Patient/caregiver to judith/doff compression garment with daily compliance to assist in lymphedema management, skin elasticity and tissue density.  (Progressing)       Start:  06/04/25    Expected End:  09/04/25            Patient to be independent and compliant with home exercise program to allow for increased function in affected limb  (Progressing)       Start:  06/04/25     Expected End:  09/04/25               STG x 4 visits       Patient will demonstrate 100% knowledge of lymphedema precautions and signs and symptoms of infection to allow for reduced lymphedema risk, infection risk and/or exacerbation of condition.  (Met)       Start:  06/04/25    Expected End:  09/04/25    Resolved:  07/07/25         Patient and/or caregiver will perform self lymphatic drainage techniques to areas within reach to enhance lymphatic drainage and skin condition.  (Progressing)       Start:  06/04/25    Expected End:  09/04/25            Patient will tolerate daily activities with multilayered bandaging or initial compression to allow for lymphatic and venous support.  (Progressing)       Start:  06/04/25    Expected End:  09/04/25                CHUNG WestT

## 2025-07-09 ENCOUNTER — CLINICAL SUPPORT (OUTPATIENT)
Dept: REHABILITATION | Facility: HOSPITAL | Age: 70
End: 2025-07-09
Payer: MEDICARE

## 2025-07-09 DIAGNOSIS — R23.9 IMPAIRED SKIN INTEGRITY: Primary | ICD-10-CM

## 2025-07-09 PROCEDURE — 97140 MANUAL THERAPY 1/> REGIONS: CPT | Mod: PO

## 2025-07-11 NOTE — PROGRESS NOTES
Outpatient Rehab    Physical Therapy Visit    Patient Name: Fanta Berg  MRN: [id number]  YOB: 1955  Encounter Date: 7/9/2025    Therapy Diagnosis:   Encounter Diagnosis   Name Primary?    Impaired skin integrity Yes     Physician: Hattie Xiong NP    Physician Orders: Eval and Treat  Medical Diagnosis: Polyarthralgia  Surgical Diagnosis: Not applicable for this Episode   Surgical Date: Not applicable for this Episode  Days Since Last Surgery: Not applicable for this Episode    Visit # / Visits Authorized:  3 / 20  Insurance Authorization Period: 5/23/2025 to 12/31/2025  Date of Evaluation: 6/4/2025  Plan of Care Certification: 6/4/2025 to 9/4/2025      PT/PTA:     Number of PTA visits since last PT visit:   Time In:     Time Out:    Total Time (in minutes):     Total Billable Time (in minutes):      FOTO:  Intake Score:  %  Survey Score 2:  %  Survey Score 3:  %    Precautions:     H/o breast cancer, standard      Subjective   States that she is continuing to take her antibiotics and her symptoms are continuing to resolve..  Pain reported as 2/10. local to posterior right upper arm - tenderness to touch    Objective            Treatment:  Manual Therapy  MT 1: short neck  MT 2: L axillaries initial prep and fill,  establish R to L AAA  MT 3: R inguinals initial prep and fill, est R AIA  MT 4: deep and visceral abdominals  MT 5: full R UE.  Noted decreased congestion evidenced by fine wrinkling to her skin on her upper arm  MT 6: MFR , supine w sh at supported 90/90 - scapulohumeral dissociation 3 x 30 secs  MT 7: reverse sequence.      Time Entry(in minutes):       Assessment & Plan   Assessment: Patient with (+) response to her antibiotics.  She tolerated manual lymphatic drainage to her right upper extremity without discomfort.   Will begin remedial therapeutic exercise at next visit.  Anticipate DC in 1-2 weeks.   Evaluation/Treatment Tolerance: Patient tolerated treatment well    The  patient will continue to benefit from skilled outpatient physical therapy in order to address the deficits listed in the problem list on the initial evaluation, provide patient and family education, and maximize the patients level of independence in the home and community environments.     The patient's spiritual, cultural, and educational needs were considered, and the patient is agreeable to the plan of care and goals.           Plan: Continue with phase 1 CDT, initiate progression to phase 2 and remedial therex in anticipation of DC in 1-2 weeks    Goals:   Active       LTG x 12 visits       Patient will show decreased girth total measurement in RUE by up to 3 cm to allow for upper extremity symmetry, shoe and clothing choice, and the ability to apply needed compression daily.  (Progressing)       Start:  06/04/25    Expected End:  08/04/25            Patient/caregiver to judith/doff compression garment with daily compliance to assist in lymphedema management, skin elasticity and tissue density.  (Progressing)       Start:  06/04/25    Expected End:  08/04/25            Patient to be independent and compliant with home exercise program to allow for increased function in affected limb  (Progressing)       Start:  06/04/25    Expected End:  08/04/25               STG x 4 visits       Patient will demonstrate 100% knowledge of lymphedema precautions and signs and symptoms of infection to allow for reduced lymphedema risk, infection risk and/or exacerbation of condition.  (Met)       Start:  06/04/25    Expected End:  09/04/25    Resolved:  07/07/25         Patient and/or caregiver will perform self lymphatic drainage techniques to areas within reach to enhance lymphatic drainage and skin condition.  (Progressing)       Start:  06/04/25    Expected End:  08/04/25            Patient will tolerate daily activities with multilayered bandaging or initial compression to allow for lymphatic and venous support.   (Progressing)       Start:  06/04/25    Expected End:  08/04/25                CHUNG WestT

## 2025-07-14 ENCOUNTER — CLINICAL SUPPORT (OUTPATIENT)
Dept: REHABILITATION | Facility: HOSPITAL | Age: 70
End: 2025-07-14
Payer: MEDICARE

## 2025-07-14 DIAGNOSIS — R23.9 IMPAIRED SKIN INTEGRITY: Primary | ICD-10-CM

## 2025-07-14 PROCEDURE — 97140 MANUAL THERAPY 1/> REGIONS: CPT | Mod: PO

## 2025-07-14 NOTE — PROGRESS NOTES
Outpatient Rehab    Physical Therapy Visit      Patient Name: Fanta Berg  MRN: [id number]  YOB: 1955  Encounter Date: 7/14/2025    Therapy Diagnosis:   Encounter Diagnosis   Name Primary?    Impaired skin integrity Yes     Physician: Hattie Xiong NP    Physician Orders: Eval and Treat  Medical Diagnosis: Polyarthralgia  Surgical Diagnosis: Not applicable for this Episode   Surgical Date: Not applicable for this Episode  Days Since Last Surgery: Not applicable for this Episode    Visit # / Visits Authorized:  4 / 20  Insurance Authorization Period: 5/23/2025 to 12/31/2025  Date of Evaluation: 6/4/2025  Plan of Care Certification: 6/4/2025 to 9/4/2025      PT/PTA:     Number of PTA visits since last PT visit:   Time In: 1100   Time Out: 1155  Total Time (in minutes): 55   Total Billable Time (in minutes):      FOTO:  Intake Score: Not applicable for this Episode%  Survey Score 2: Not applicable for this Episode%  Survey Score 3: Not applicable for this Episode%    Precautions:  Additional Precautions and Protocol Details: H/o breast cancer, standard      Subjective   Reports that it has been 2 weeks since her cellulitis diagnosis, she still has a few doses left 2' missed doses.  she was encouraged to continue taking her meds as prescribed.  She has residual fullness to her R upper arm, otherwise her sxs have resolved.  She continues to wear her tubi- compression and do her hep as recommended.  she had irritation from removing the kinesiotape.  She reports that there was decreased fluid load at her lateral r breast after tape removed..  Pain reported as 0/10.      Objective            Treatment:  Manual Therapy  MT 1: short neck  MT 2: L axillaries initial prep and fill,  establish R to L AAA  MT 3: R inguinals initial prep and fill, est R AIA  MT 4: deep and visceral abdominals  MT 5: full R UE.  Noted decreased congestion evidenced by fine wrinkling to her skin on her upper arm  MT 6: MFR  , supine w sh at supported 90/90 - scapulohumeral dissociation 3 x 30 secs  MT 7: reverse sequence.  MT 8: Patient was fitted for a UE compression sleeve, 20-30 mmHg.  She was given info on online ordering.  Recommended a Futuro, size M arm sleeve.  She was also recommended to consider purchasing a sports bra so that she could be fitted with a foam chips pad for her R flank.   She was given a sample pic for online ordering.  She stated that her sister would assist her with ordering.  MT 9: A request was sent to the referring provider requesting a prescription for a compression sleeve.      Time Entry(in minutes):  Manual Therapy Time Entry: 55    Assessment & Plan   Assessment: Patient continues with residual post mastectomy and post-cellulitis right upper extremity lymphedema.  Her cellulitis symptoms have resolved.   She is ready to be fitted for a sleeve.  She would benefit from use of sequential compression device at the next visit, once she has complete her antibiotics.  Recommend initiate transition to phase II complete decongestive therapy, with anticipated DC in 2-3 visits.   Evaluation/Treatment Tolerance: Patient tolerated treatment well    The patient will continue to benefit from skilled outpatient physical therapy in order to address the deficits listed in the problem list on the initial evaluation, provide patient and family education, and maximize the patients level of independence in the home and community environments.     The patient's spiritual, cultural, and educational needs were considered, and the patient is agreeable to the plan of care and goals.           Plan: Continue with phase 1 CDT, initiate progression to phase 2 and remedial therex in anticipation of DC in 1-2 weeks    Goals:   Active       LTG x 12 visits       Patient will show decreased girth total measurement in RUE by up to 3 cm to allow for upper extremity symmetry, shoe and clothing choice, and the ability to apply needed  compression daily.  (Progressing)       Start:  06/04/25    Expected End:  08/04/25            Patient/caregiver to judith/doff compression garment with daily compliance to assist in lymphedema management, skin elasticity and tissue density.  (Progressing)       Start:  06/04/25    Expected End:  08/04/25            Patient to be independent and compliant with home exercise program to allow for increased function in affected limb  (Progressing)       Start:  06/04/25    Expected End:  08/04/25              Resolved       STG x 4 visits       Patient will demonstrate 100% knowledge of lymphedema precautions and signs and symptoms of infection to allow for reduced lymphedema risk, infection risk and/or exacerbation of condition.  (Met)       Start:  06/04/25    Expected End:  09/04/25    Resolved:  07/07/25         Patient and/or caregiver will perform self lymphatic drainage techniques to areas within reach to enhance lymphatic drainage and skin condition.  (Met)       Start:  06/04/25    Expected End:  08/04/25    Resolved:  07/14/25         Patient will tolerate daily activities with multilayered bandaging or initial compression to allow for lymphatic and venous support.  (Met)       Start:  06/04/25    Expected End:  08/04/25    Resolved:  07/14/25             Junie Pandey PT CLT

## 2025-07-21 ENCOUNTER — CLINICAL SUPPORT (OUTPATIENT)
Dept: REHABILITATION | Facility: HOSPITAL | Age: 70
End: 2025-07-21
Payer: MEDICARE

## 2025-07-21 DIAGNOSIS — R23.9 IMPAIRED SKIN INTEGRITY: Primary | ICD-10-CM

## 2025-07-21 PROCEDURE — 97140 MANUAL THERAPY 1/> REGIONS: CPT | Mod: PO

## 2025-07-22 NOTE — PROGRESS NOTES
Outpatient Rehab    Physical Therapy Visit    Patient Name: Fanta Berg  MRN: [id number]  YOB: 1955  Encounter Date: 7/21/2025    Therapy Diagnosis:   Encounter Diagnosis   Name Primary?    Impaired skin integrity Yes     Physician: Hattie Xiong NP    Physician Orders: Eval and Treat  Medical Diagnosis: Polyarthralgia  Surgical Diagnosis: Not applicable for this Episode   Surgical Date: Not applicable for this Episode  Days Since Last Surgery: Not applicable for this Episode    Visit # / Visits Authorized:  5 / 20  Insurance Authorization Period: 5/23/2025 to 12/31/2025  Date of Evaluation: 6/4/2025  Plan of Care Certification: 6/4/2025 to 9/4/2025      PT/PTA: PT   Number of PTA visits since last PT visit:   Time In: 1000   Time Out: 1055  Total Time (in minutes): 55   Total Billable Time (in minutes):      FOTO:  Intake Score (%): Not applicable for this Episode  Survey Score 2 (%): Not applicable for this Episode  Survey Score 3 (%): Not applicable for this Episode    Precautions:  Additional Precautions and Protocol Details: H/o breast cancer, standard      Subjective   States that she has finished her antibiotics.  Her arm has increased fluid cingestion today.  she denies fever, chills, tenderness, increased inflammation.  she is aware of the s/s of cellulitis.   She notes that she had increased salt intake over the weekend and that she was outside and got hot, possibley leading to the increased fluid load..  Pain reported as 0/10.      Objective            Treatment:  Manual Therapy  MT 1: short neck  MT 2: L axillaries initial prep and fill,  establish R to L AAA  MT 3: R inguinals initial prep and fill, est R AIA  MT 4: deep and visceral abdominals  MT 5: full R UE.  Noted decreased congestion evidenced by fine wrinkling to her skin on her upper arm  MT 6: MFR , supine w sh at supported 90/90 - scapulohumeral dissociation 3 x 30 secs  MT 7: reverse sequence.  MT 8: Patient had arrive  with her new compression garment, Futuro, size M, 20-30-mmHg.   She was assisted to don and given instructions for self application.  Once donned, garment was assessed and noted to have good fit and containment.  She stated that she understood instructiona, including wear and care schedule.      Time Entry(in minutes):  Manual Therapy Time Entry: 55    Assessment & Plan   Assessment: Ms. Berg is approaching goals attainment.  It is anticipated that she will be ready for DC in 1-2 weeks, will initiate phase 2 of complete decongestive therapy next visit.   She had increased fluid load this visit, but did not have signs and symptoms of recurrent cellulitis.  She is aware of when to seek medical attention should she believe she has an infection.   Evaluation/Treatment Tolerance: Patient tolerated treatment well    The patient will continue to benefit from skilled outpatient physical therapy in order to address the deficits listed in the problem list on the initial evaluation, provide patient and family education, and maximize the patients level of independence in the home and community environments.     The patient's spiritual, cultural, and educational needs were considered, and the patient is agreeable to the plan of care and goals.           Plan: initiate progression to phase 2 and remedial therex in anticipation of DC in 1-2 weeks    Goals:   Active       LTG x 12 visits       Patient will show decreased girth total measurement in RUE by up to 3 cm to allow for upper extremity symmetry, shoe and clothing choice, and the ability to apply needed compression daily.  (Progressing)       Start:  06/04/25    Expected End:  08/04/25            Patient/caregiver to judith/doff compression garment with daily compliance to assist in lymphedema management, skin elasticity and tissue density.  (Progressing)       Start:  06/04/25    Expected End:  08/04/25            Patient to be independent and compliant with home exercise  program to allow for increased function in affected limb  (Progressing)       Start:  06/04/25    Expected End:  08/04/25              Resolved       STG x 4 visits       Patient will demonstrate 100% knowledge of lymphedema precautions and signs and symptoms of infection to allow for reduced lymphedema risk, infection risk and/or exacerbation of condition.  (Met)       Start:  06/04/25    Expected End:  09/04/25    Resolved:  07/07/25         Patient and/or caregiver will perform self lymphatic drainage techniques to areas within reach to enhance lymphatic drainage and skin condition.  (Met)       Start:  06/04/25    Expected End:  08/04/25    Resolved:  07/14/25         Patient will tolerate daily activities with multilayered bandaging or initial compression to allow for lymphatic and venous support.  (Met)       Start:  06/04/25    Expected End:  08/04/25    Resolved:  07/14/25             Junie Pandey PT CLT

## 2025-07-23 ENCOUNTER — OFFICE VISIT (OUTPATIENT)
Facility: CLINIC | Age: 70
End: 2025-07-23
Payer: MEDICARE

## 2025-07-23 VITALS
TEMPERATURE: 99 F | HEART RATE: 59 BPM | OXYGEN SATURATION: 99 % | SYSTOLIC BLOOD PRESSURE: 139 MMHG | WEIGHT: 140.31 LBS | DIASTOLIC BLOOD PRESSURE: 86 MMHG | HEIGHT: 61 IN | BODY MASS INDEX: 26.49 KG/M2

## 2025-07-23 DIAGNOSIS — Z85.3 HISTORY OF BREAST CANCER IN ADULTHOOD: Primary | ICD-10-CM

## 2025-07-23 DIAGNOSIS — I89.0 LYMPHEDEMA OF RIGHT ARM: ICD-10-CM

## 2025-07-23 DIAGNOSIS — M15.0 PRIMARY OSTEOARTHRITIS INVOLVING MULTIPLE JOINTS: ICD-10-CM

## 2025-07-23 DIAGNOSIS — Z85.72 HISTORY OF NON-HODGKIN'S LYMPHOMA: ICD-10-CM

## 2025-07-23 PROCEDURE — 99215 OFFICE O/P EST HI 40 MIN: CPT | Mod: S$PBB,,, | Performed by: INTERNAL MEDICINE

## 2025-07-23 PROCEDURE — 99999 PR PBB SHADOW E&M-EST. PATIENT-LVL IV: CPT | Mod: PBBFAC,,, | Performed by: INTERNAL MEDICINE

## 2025-07-23 PROCEDURE — G2211 COMPLEX E/M VISIT ADD ON: HCPCS | Mod: ,,, | Performed by: INTERNAL MEDICINE

## 2025-07-23 PROCEDURE — 99214 OFFICE O/P EST MOD 30 MIN: CPT | Mod: PBBFAC,PN | Performed by: INTERNAL MEDICINE

## 2025-07-23 NOTE — PROGRESS NOTES
[facility] [address] hematology Oncology in office subsequent encounter note    25    Subjective:      Patient ID:   Fanta Berg  69 y.o. female  1955  [place]      Chief Complaint:  History of non-Hodgkin's lymphoma.     Hx of bilateral breast cancer.    Bilateral mastectomy with reconstruction.  Adjuvant tamoxifen x's 5 years.    She has developed lymphedema at the right arm and is seen Sneha at physical therapy with much improvement in symptoms.  She is wearing a lymphedema sleeve.  She is seeing NP Hattie Xiong at Dr. Pierre's office for her primary care.  We are checking her BRCA 1 and 2, she will follow-up here in 1 month, can cancel and return to clinic in 1 year.      She has GERD,  she is on Celebrex and Nexium.  LBP sx.  MRI with DDD, DJD.  She was recently evaluated for GB sx.    MGUS . Followed at [facility].    Hx small cell NHL, treated with -R x's , then Rituxan maintenance.  Had neuro? Sx after Rituxan 2015, Rituxan D/C ed. Low grade NHL.  Follicular vs marginal NHL. .    Chronic LBP sx 2nd DDD. S/P epidural injections.  Chronic neck pains, hx C spine fx.  On PT.    Hx HTN, rheumatoid fever as a child, GERD, arthritis sx, DDD.    Ovarian tumor removed at [facility] 2005.  R MRM   L mastectomy   T & A  Back surgery   C section x's 3  Breast implants L & R 2006  Port removed 2012  Complete hysterectomy [facility]  M0    She has joint pain sx, DJD, on Mobic 15mg  [phone]    Allergy benadryl, codiene, levaquin    Smoke rarely, ETOH no, Job , disabled    Mom small cell lung cancer  Dad pancreatic cancer  Sister HTN  Son AMI  Daughter schizophrenia  Daughter panic attacks  Mom, sisters, daughters no breast cancer    ROS:   GEN: normal without any fever, night sweats or weight loss  HEENT:See HPI.   sore throat, stiff neck, changes in vision  CV: RF as a child,     no CP, SOB, PND, MACHADO or orthopnea  PULM: normal with no SOB,  "cough, hemoptysis, sputum or pleuritic pain  GI: GERD+.   no abdominal pain, nausea, vomiting, constipation, diarrhea, melanotic stools, BRBPR, or hematemesis  : normal with no hematuria, dysuria  BREAST: See HPI  SKIN: normal with no rash, erythema, bruising, or swelling     Past Medical History:   Diagnosis Date    Breast cancer     Breast cancer     bilat mast, chemo, arimidex     mda    Cancer     Cardiomyopathy due to chemotherapy     DDD (degenerative disc disease)     GERD (gastroesophageal reflux disease)     NHL (nodular histiocytic lymphoma)     Non Hodgkin's lymphoma     Non-Hodgkin lymphoma     Small cell B-cell lymphoma      Past Surgical History:   Procedure Laterality Date    BACK SURGERY      BREAST RECONSTRUCTION Bilateral 2006    MD Lyman     BREAST RECONSTRUCTION Bilateral 2006    MD Lyman    BREAST SURGERY       SECTION      COLONOSCOPY  2012    NARA THOMAS MD    ESOPHAGOGASTRODUODENOSCOPY  2012    NARA THOMAS MD    HYSTERECTOMY      MEDIPORT REMOVAL Right 2020    Procedure: REMOVAL, CATHETER, CENTRAL VENOUS, TUNNELED WITH PORT;  Surgeon: Jeff Amin III, MD;  Location: [facility];  Service: General;  Laterality: Right;    port-a-cath placement Right     SPINE SURGERY      TONSILLECTOMY      TOTAL ABDOMINAL HYSTERECTOMY W/ BILATERAL SALPINGOOPHORECTOMY         Review of patient's allergies indicates:   Allergen Reactions    Benadryl [diphenhydramine hcl] Anaphylaxis    Levaquin [levofloxacin] Other (See Comments)     Permanent tendon & ligament damage    Ibuprofen-diphenhydramine cit Nausea Only      "Any meds with PM behind them "    Sulfamethoxazole-trimethoprim Nausea And Vomiting    Tolmetin Nausea Only    Codeine Palpitations and Rash    Duloxetine hcl Other (See Comments)     "increased sweating"         Current Outpatient Medications:     acetaminophen (TYLENOL) 650 MG TbSR, Take 650 mg by mouth every 8 (eight) hours., Disp: , Rfl:     " "acyclovir 5% (ZOVIRAX) 5 % ointment, Apply topically 5 (five) times daily., Disp: 30 g, Rfl: 1    ARTHRITIS PAIN, DICLOFENAC, 1 % Gel, APPLY 2 GRAMS TOPICALLY TWICE DAILY AS NEEDED, Disp: 100 g, Rfl: 0    esomeprazole (NEXIUM) 40 MG capsule, Take once daily with celebrex as needed, Disp: 90 capsule, Rfl: 2    fluticasone propionate (FLONASE) 50 mcg/actuation nasal spray, SQUIRT 2 SPRAYS IN EACH NOSTRIL EVERY DAY AS DIRECTED, Disp: 16 g, Rfl: 10    meloxicam (MOBIC) 15 MG tablet, Take 1 tablet (15 mg total) by mouth once daily., Disp: 90 tablet, Rfl: 3    mupirocin (BACTROBAN) 2 % ointment, APPLY TO AFFECTED AREA TOPICALLY ONCE DAILY AS DIRECTED, Disp: 22 g, Rfl: 1    simethicone (MYLICON) 125 mg Cap capsule, Take 1 capsule (125 mg total) by mouth daily as needed for Flatulence., Disp: 30 capsule, Rfl: 1    ascorbic acid/multivit-min (VITAMIN C FIZZY DRINK ORAL), Take 1,000 mg by mouth once daily. (Patient not taking: Reported on 7/23/2025), Disp: , Rfl:     biotin 1 mg Cap, Take 2.5 mg by mouth Daily. (Patient not taking: Reported on 7/23/2025), Disp: , Rfl:     cholecalciferol, vitamin D3, 125 mcg (5,000 unit) Tab, Take 5,000 Units by mouth once daily. (Patient not taking: Reported on 7/23/2025), Disp: , Rfl:     ciclopirox (PENLAC) 8 % Soln, Apply topically nightly. (Patient not taking: Reported on 7/23/2025), Disp: 6.6 mL, Rfl: 1      Objective:   Vitals:  Blood pressure 139/86, pulse (!) 59, temperature 98.7 °F (37.1 °C), temperature source Temporal, height 5' 1" (1.549 m), weight 63.6 kg (140 lb 4.8 oz), SpO2 99%.    Physical Examination:   GEN: no apparent distress, comfortable  HEAD: atraumatic and normocephalic  EYES: no pallor, no icterus  ENT: no pharyngeal erythema, external ears WNL; no nasal discharge; no thrush  NECK: no masses, thyroid normal, trachea midline, no LAD/LN's, supple  CV: RRR with no murmur; normal pulse; normal S1 and S2; no pedal edema  CHEST: Normal respiratory effort; CTAB; normal " "breath sounds; no wheeze or crackles  ABDOM: nontender and nondistended; soft;  no rebound/guarding  MUSC/Skeletal: ROM normal; no crepitus; joints normal  EXTREM: no clubbing, cyanosis, inflammation or swelling  SKIN: no rashes, lesions, ulcers, petechiae.  She is wearing the lymphedema sleeve at the right arm and does not have peripheral edema noted now.  : no CVAT  NEURO: grossly intact; motor/sensory WNL;  no tremors  PSYCH: normal mood, affect and behavior  LYMPH: normal cervical, supraclavicular, axillary and groin LN's  "BREASTS": no palpable mass.  I do not palpate fullness or tenderness on examination of the left or right chest.  She has extensive postop change with bilateral implants in place.      Labs:   Lab Results   Component Value Date    WBC 10.1 01/19/2024    HGB 13.3 01/19/2024    HCT 39.1 01/19/2024    MCV 90.1 01/19/2024     01/19/2024    CMP  Sodium   Date Value Ref Range Status   01/19/2024 140 135 - 146 mmol/L Final   10/09/2017 137 134 - 144 mmol/L      Potassium   Date Value Ref Range Status   01/19/2024 4.7 3.5 - 5.3 mmol/L Final     Chloride   Date Value Ref Range Status   01/19/2024 103 98 - 110 mmol/L Final   10/09/2017 100 98 - 110 mmol/L      CO2   Date Value Ref Range Status   01/19/2024 27 20 - 32 mmol/L Final     Glucose   Date Value Ref Range Status   01/19/2024 86 65 - 139 mg/dL Final     Comment:               Non-fasting reference interval        10/09/2017 87 70 - 99 mg/dL      BUN   Date Value Ref Range Status   01/19/2024 16 7 - 25 mg/dL Final     Creatinine   Date Value Ref Range Status   01/19/2024 0.77 0.60 - 1.00 mg/dL Final   10/09/2017 0.63 0.60 - 1.40 mg/dL      Calcium   Date Value Ref Range Status   01/19/2024 9.9 8.6 - 10.4 mg/dL Final     Total Protein   Date Value Ref Range Status   01/19/2024 7.1 6.1 - 8.1 g/dL Final     Albumin   Date Value Ref Range Status   01/19/2024 4.4 3.6 - 5.1 g/dL Final   10/09/2017 4.3 3.1 - 4.7 g/dL      Total Bilirubin   Date " Value Ref Range Status   01/19/2024 0.6 0.2 - 1.2 mg/dL Final     Alkaline Phosphatase   Date Value Ref Range Status   04/16/2020 79 37 - 153 U/L Final     AST   Date Value Ref Range Status   01/19/2024 16 10 - 35 U/L Final     ALT   Date Value Ref Range Status   01/19/2024 13 6 - 29 U/L Final     Anion Gap   Date Value Ref Range Status   02/06/2020 11 8 - 16 mmol/L Final   02/06/2020 11 8 - 16 mmol/L Final     eGFR if    Date Value Ref Range Status   03/08/2022 104 > OR = 60 mL/min/1.73m2 Final     eGFR if non    Date Value Ref Range Status   03/08/2022 90 > OR = 60 mL/min/1.73m2 Final       Radiology/Diagnostic Studies:    PET scan July 2020 does not show evidence for recurrence or metastatic cancer, pulmonary nodule is stable since 2013.  Marta catheter was removed in February 2020.    Assessment:   (1) 69 y.o. female with diagnosis of  Bilateral breast cancer 2005, low grade NHL 2013.    No evidence for recurrent breast cancer or NHL, on last Pet 7/2020.    RML pulmonary nodule, stable since 2013.    (2) on last visit, she had Joint, bone pain, check bone scan.  The bone scan shows suspected Fx R 8th and L 9th ribs.  Bone Scan shows multiple areas of DJD.  She has osteopenia per bone density.    Metastatic dx not seen.  Rib x rays ordered at [facility].    Addendum: X ray of ribs did not show fx or lytic area or abnormality.    (3) she returns to clinic today.  She does not complain of arthritic symptoms today.    (4) lymphedema symptoms under management of physical therapy, she is wearing a lymphedema sleeve, and symptoms are improved.    (5) history of bilateral breast cancer and ovarian tumor.  BRCA 1 and BRCA 2 testing ordered to check for the breast cancer gene.    Return to clinic in 1 month can cancel if BRCA testing is negative.  Return to clinic in 1 year.    Roscoe Corral MD, hematology Oncology, July 23, 2025

## 2025-07-23 NOTE — LETTER
July 23, 2025        Bharath Pierre III, MD  [address]  [place] [address]             [facility]  [address] [facility]  [address]  [place] [address]  Phone: [phone]  Fax: [phone]   Patient: Fanta Berg   MR Number: [id number]   YOB: 1955   Date of Visit: 7/23/2025       Dear Dr. Pierre:    Thank you for referring Fanta Berg to me for evaluation. Below are the relevant portions of my assessment and plan of care.            If you have questions, please do not hesitate to call me. I look forward to following Fanta along with you.    Sincerely,      PRIYANK Noonan MD           CC  No Recipients

## 2025-07-28 ENCOUNTER — TELEPHONE (OUTPATIENT)
Facility: CLINIC | Age: 70
End: 2025-07-28
Payer: MEDICARE

## 2025-07-28 ENCOUNTER — CLINICAL SUPPORT (OUTPATIENT)
Dept: REHABILITATION | Facility: HOSPITAL | Age: 70
End: 2025-07-28
Payer: MEDICARE

## 2025-07-28 DIAGNOSIS — R23.9 IMPAIRED SKIN INTEGRITY: Primary | ICD-10-CM

## 2025-07-28 DIAGNOSIS — I89.0 LYMPHEDEMA OF RIGHT ARM: Primary | ICD-10-CM

## 2025-07-28 PROCEDURE — 97140 MANUAL THERAPY 1/> REGIONS: CPT | Mod: PO

## 2025-07-28 NOTE — TELEPHONE ENCOUNTER
"----- Message from Junie Pandey PT sent at 7/28/2025 10:59 AM CDT -----  Regarding: Prescription for compression sleeve  Good Morning,    Ms. Berg has been followed for lymphedema to her right upper extremity  and is ready to be fitted for compression sleeves.  Would you please write a prescription for " 20-30 mmHg compression sleeve, wrist to axilla with silicone top band, quantity x 3".   I will forward it to a durable medical equipment vendor near her home.     Thank you,    Junie Pandey PT CLT  "

## 2025-07-28 NOTE — PROGRESS NOTES
Outpatient Rehab    Physical Therapy Progress Note    Patient Name: Fanta Berg  MRN: [id number]  YOB: 1955  Encounter Date: 7/28/2025    Therapy Diagnosis:   Encounter Diagnosis   Name Primary?    Impaired skin integrity Yes     Physician: Hattie Xiong NP    Physician Orders: Eval and Treat  Medical Diagnosis: Polyarthralgia  Surgical Diagnosis: Not applicable for this Episode   Surgical Date: Not applicable for this Episode  Days Since Last Surgery: Not applicable for this Episode    Visit # / Visits Authorized:  6 / 20  Insurance Authorization Period: 5/23/2025 to 12/31/2025  Date of Evaluation: 6/4/2025  Plan of Care Certification: 6/4/2025 to 9/4/2025      PT/PTA: PT   Number of PTA visits since last PT visit:   Time In: 1000   Time Out: 1055  Total Time (in minutes): 55   Total Billable Time (in minutes):      FOTO:  Intake Score (%): Not applicable for this Episode  Survey Score 2 (%): Not applicable for this Episode  Survey Score 3 (%): Not applicable for this Episode    Precautions:  Additional Precautions and Protocol Details: H/o breast cancer, standard    Subjective   She states that she continues to feel better.  Her arm has decreased fluid load and she is having less discomfort overall.  She has been drinking more water and states that she has more energy and fewer aches..  Pain reported as 0/10. local to posterior right upper arm - tenderness to touch    Objective            Treatment:  Manual Therapy  MT 1: short neck  MT 2: L axillaries initial prep and fill,  establish R to L AAA  MT 3: R inguinals initial prep and fill, est R AIA  MT 4: deep and visceral abdominals  MT 5: full R UE.  Noted decreased congestion evidenced by fine wrinkling to her skin on her upper arm  MT 6: MFR , supine w sh at supported 90/90 - scapulohumeral dissociation 3 x 30 secs  Other Activities  Activity 1: She arrives with her garment donned.  She will benefit from additional garments so she has one  to wash and one to wear.  Will request prescription from her provider.  Activity 2: Patient was issued handout, and the received instruction and practice with dry brushing program for fluid mobilization to her RUQ.  She was able to return demo using handout as a guide.    Time Entry(in minutes):  Manual Therapy Time Entry: 55    Assessment & Plan   Assessment: Ms. Berg is approaching goals attainment.  It is anticipated that she will be ready for DC in 1-2 weeks, will initiate phase 2 of complete decongestive therapy next visit.   She will benefit from additional sleeve, will request an updated prescription from her oncologist and then send to a durable medical equipment provider near her home.   Evaluation/Treatment Tolerance: Patient tolerated treatment well    The patient will continue to benefit from skilled outpatient physical therapy in order to address the deficits listed in the problem list on the initial evaluation, provide patient and family education, and maximize the patients level of independence in the home and community environments.     The patient's spiritual, cultural, and educational needs were considered, and the patient is agreeable to the plan of care and goals.           Plan: redo FOTO and remeasure, initiate progression to phase 2 and remedial therex in anticipation of DC in 1-2 weeks    Goals:   Active       LTG x 12 visits       Patient will show decreased girth total measurement in RUE by up to 3 cm to allow for upper extremity symmetry, shoe and clothing choice, and the ability to apply needed compression daily.  (Progressing)       Start:  06/04/25    Expected End:  08/04/25            Patient/caregiver to judith/doff compression garment with daily compliance to assist in lymphedema management, skin elasticity and tissue density.  (Met)       Start:  06/04/25    Expected End:  08/04/25    Resolved:  07/28/25         Patient to be independent and compliant with home exercise program to  allow for increased function in affected limb  (Met)       Start:  06/04/25    Expected End:  08/04/25    Resolved:  07/28/25           Resolved       STG x 4 visits       Patient will demonstrate 100% knowledge of lymphedema precautions and signs and symptoms of infection to allow for reduced lymphedema risk, infection risk and/or exacerbation of condition.  (Met)       Start:  06/04/25    Expected End:  09/04/25    Resolved:  07/07/25         Patient and/or caregiver will perform self lymphatic drainage techniques to areas within reach to enhance lymphatic drainage and skin condition.  (Met)       Start:  06/04/25    Expected End:  08/04/25    Resolved:  07/14/25         Patient will tolerate daily activities with multilayered bandaging or initial compression to allow for lymphatic and venous support.  (Met)       Start:  06/04/25    Expected End:  08/04/25    Resolved:  07/14/25             Junie Pandey PT CLT

## 2025-07-30 ENCOUNTER — PATIENT MESSAGE (OUTPATIENT)
Dept: REHABILITATION | Facility: HOSPITAL | Age: 70
End: 2025-07-30
Payer: MEDICARE

## 2025-08-05 ENCOUNTER — PATIENT MESSAGE (OUTPATIENT)
Dept: REHABILITATION | Facility: HOSPITAL | Age: 70
End: 2025-08-05
Payer: MEDICARE

## 2025-08-06 ENCOUNTER — CLINICAL SUPPORT (OUTPATIENT)
Dept: REHABILITATION | Facility: HOSPITAL | Age: 70
End: 2025-08-06
Payer: MEDICARE

## 2025-08-06 DIAGNOSIS — R23.9 IMPAIRED SKIN INTEGRITY: Primary | ICD-10-CM

## 2025-08-06 PROCEDURE — 97110 THERAPEUTIC EXERCISES: CPT | Mod: PO

## 2025-08-06 PROCEDURE — 97140 MANUAL THERAPY 1/> REGIONS: CPT | Mod: PO

## 2025-08-06 NOTE — PROGRESS NOTES
Outpatient Rehab    Physical Therapy Visit    Patient Name: Fanta Berg  MRN: [id number]  YOB: 1955  Encounter Date: 8/6/2025    Therapy Diagnosis:   Encounter Diagnosis   Name Primary?    Impaired skin integrity Yes     Physician: Hattie Xiong NP    Physician Orders: Eval and Treat  Medical Diagnosis: Polyarthralgia  Surgical Diagnosis: Not applicable for this Episode   Surgical Date: Not applicable for this Episode  Days Since Last Surgery: Not applicable for this Episode    Visit # / Visits Authorized:  7 / 20  Insurance Authorization Period: 5/23/2025 to 12/31/2025  Date of Evaluation: 6/4/2025  Plan of Care Certification: 6/4/2025 to 9/4/2025      PT/PTA: PT   Number of PTA visits since last PT visit:   Time In: 1100   Time Out: 1145  Total Time (in minutes): 45   Total Billable Time (in minutes):      FOTO:  Intake Score (%): Not applicable for this Episode  Survey Score 2 (%): Not applicable for this Episode  Survey Score 3 (%): Not applicable for this Episode    Precautions:  Additional Precautions and Protocol Details: H/o breast cancer, standard      Subjective   She states that she continues to feel better. She need to wait until next mon th to purchase a bra to have foam chips pad placed.  she was notified that her garment request was sent to [employer].  she was given their business flyer and told to answer the [employer] if they call.   She was agreeable..  Pain reported as 0/10.      Objective            Treatment:  Therapeutic Exercise  TE 1: 10 reps each of lime tband bilat ER, HAB and scaption diagonals.   She was issued handout with instruction and review.  She was able to return demo using good technique and handout for guide.  Manual Therapy  MT 1: short neck  MT 2: L axillaries initial prep and fill,  establish R to L AAA  MT 3: R inguinals initial prep and fill, est R AIA  MT 4: deep and visceral abdominals  MT 5: full R UE.  Noted decreased congestion evidenced by fine  wrinkling to her skin on her upper arm  Other Activities  Activity 1: Measurements for garment fitting emailed to [employer].   wrist 15.0 cm, forearm 25.8 cm, fullest upper arm 32.0 cm.  Lenght 42.0 cm.  Activity 2: Patient was re-issued handout, and the received instruction and practice with dry brushing program for fluid mobilization to her RUQ.  She was able to return demo using handout as a guide.    Time Entry(in minutes):  Manual Therapy Time Entry: 35  Therapeutic Exercise Time Entry: 10    Assessment & Plan   Assessment: Patient is doing very well.  She is ready for DC after next visit, will advance her HOME EXERCISE PROGRAM and continue to assist with durable medical equipment acquisition.  Evaluation/Treatment Tolerance: Patient tolerated treatment well    The patient will continue to benefit from skilled outpatient physical therapy in order to address the deficits listed in the problem list on the initial evaluation, provide patient and family education, and maximize the patients level of independence in the home and community environments.     The patient's spiritual, cultural, and educational needs were considered, and the patient is agreeable to the plan of care and goals.           Plan: redo FOTO and remeasure, review and update HEP, DC after next visit.    Goals:   Active       LTG x 12 visits       Patient will show decreased girth total measurement in RUE by up to 3 cm to allow for upper extremity symmetry, shoe and clothing choice, and the ability to apply needed compression daily.  (Progressing)       Start:  06/04/25    Expected End:  08/04/25            Patient/caregiver to judith/doff compression garment with daily compliance to assist in lymphedema management, skin elasticity and tissue density.  (Met)       Start:  06/04/25    Expected End:  08/04/25    Resolved:  07/28/25         Patient to be independent and compliant with home exercise program to allow for increased function in affected  limb  (Met)       Start:  06/04/25    Expected End:  08/04/25    Resolved:  07/28/25           Resolved       STG x 4 visits       Patient will demonstrate 100% knowledge of lymphedema precautions and signs and symptoms of infection to allow for reduced lymphedema risk, infection risk and/or exacerbation of condition.  (Met)       Start:  06/04/25    Expected End:  09/04/25    Resolved:  07/07/25         Patient and/or caregiver will perform self lymphatic drainage techniques to areas within reach to enhance lymphatic drainage and skin condition.  (Met)       Start:  06/04/25    Expected End:  08/04/25    Resolved:  07/14/25         Patient will tolerate daily activities with multilayered bandaging or initial compression to allow for lymphatic and venous support.  (Met)       Start:  06/04/25    Expected End:  08/04/25    Resolved:  07/14/25             Junie Pandey PT CLT

## 2025-08-13 ENCOUNTER — CLINICAL SUPPORT (OUTPATIENT)
Dept: REHABILITATION | Facility: HOSPITAL | Age: 70
End: 2025-08-13
Payer: MEDICARE

## 2025-08-13 ENCOUNTER — PATIENT MESSAGE (OUTPATIENT)
Dept: REHABILITATION | Facility: HOSPITAL | Age: 70
End: 2025-08-13

## 2025-08-13 DIAGNOSIS — R23.9 IMPAIRED SKIN INTEGRITY: Primary | ICD-10-CM

## 2025-08-13 PROCEDURE — 97140 MANUAL THERAPY 1/> REGIONS: CPT | Mod: PO

## 2025-08-13 PROCEDURE — 97110 THERAPEUTIC EXERCISES: CPT | Mod: PO

## 2025-08-21 ENCOUNTER — TELEPHONE (OUTPATIENT)
Facility: CLINIC | Age: 70
End: 2025-08-21
Payer: MEDICARE

## 2025-08-22 ENCOUNTER — TELEPHONE (OUTPATIENT)
Facility: CLINIC | Age: 70
End: 2025-08-22
Payer: MEDICARE

## (undated) DEVICE — UNDERGLOVE BIOGEL PI MICRO BLUE SZ 7

## (undated) DEVICE — TOWEL OR BLUE      MDT2168284

## (undated) DEVICE — SYRINGE 10ML 302995

## (undated) DEVICE — SUTURE MONOCRYL 4-0 PS-2 27 MCP426H

## (undated) DEVICE — DRESSING POST OP MEPILEX AG 4X6  498300

## (undated) DEVICE — DRAPE LAPAROTOMY TRANSVERSE 89281

## (undated) DEVICE — PENCIL BOVIE E2515H

## (undated) DEVICE — GOWN SMART LRG 044673

## (undated) DEVICE — SUTURE VICRYL 3-0 18 SH VCP864D

## (undated) DEVICE — TRAY MINOR SLIDELL MEMORIAL HOSPITAL

## (undated) DEVICE — GLOVE BIOGEL PI ULTRA TOUCH G GRAY SZ6.5

## (undated) DEVICE — PACK BASIC V 88151

## (undated) DEVICE — SOLUTION IRRI NS BOTTLE 1000ML R5200-01

## (undated) DEVICE — UNDERGLOVE BIOGEL PI MICRO BLUE SZ 8

## (undated) DEVICE — COVER LIGHT HANDLE LB53

## (undated) DEVICE — NEEDLE SAFETY ECLIPSE 25G 1-1/2IN 305767

## (undated) DEVICE — SUTURE VICRYL PS-2 4-0 27 J426H

## (undated) DEVICE — GLOVE BIOGEL PI ULTRA TOUCH GRAY SZ7.5

## (undated) DEVICE — GLOVE BIOGEL PI GOLD SZ 6.5

## (undated) DEVICE — GOWN X-LARGE 044674